# Patient Record
Sex: FEMALE | Race: WHITE | NOT HISPANIC OR LATINO | Employment: FULL TIME | URBAN - METROPOLITAN AREA
[De-identification: names, ages, dates, MRNs, and addresses within clinical notes are randomized per-mention and may not be internally consistent; named-entity substitution may affect disease eponyms.]

---

## 2020-09-24 ENCOUNTER — HOSPITAL ENCOUNTER (EMERGENCY)
Facility: CLINIC | Age: 30
Discharge: HOME | End: 2020-09-24
Attending: EMERGENCY MEDICINE | Admitting: EMERGENCY MEDICINE
Payer: COMMERCIAL

## 2020-09-24 VITALS
DIASTOLIC BLOOD PRESSURE: 75 MMHG | WEIGHT: 155 LBS | SYSTOLIC BLOOD PRESSURE: 136 MMHG | HEIGHT: 68 IN | TEMPERATURE: 99.1 F | BODY MASS INDEX: 23.49 KG/M2 | OXYGEN SATURATION: 96 % | RESPIRATION RATE: 18 BRPM | HEART RATE: 82 BPM

## 2020-09-24 DIAGNOSIS — R33.9 URINARY RETENTION: ICD-10-CM

## 2020-09-24 LAB
ALBUMIN UR-MCNC: 100 MG/DL
APPEARANCE UR: ABNORMAL
BILIRUB UR QL STRIP: NEGATIVE
COLOR UR AUTO: ABNORMAL
GLUCOSE UR STRIP-MCNC: NEGATIVE MG/DL
HGB UR QL STRIP: ABNORMAL
KETONES UR STRIP-MCNC: NEGATIVE MG/DL
LEUKOCYTE ESTERASE UR QL STRIP: ABNORMAL
MUCOUS THREADS #/AREA URNS LPF: PRESENT /LPF
NITRATE UR QL: NEGATIVE
PH UR STRIP: 7 PH (ref 5–7)
RBC #/AREA URNS AUTO: >182 /HPF (ref 0–2)
SOURCE: ABNORMAL
SP GR UR STRIP: 1.02 (ref 1–1.03)
UROBILINOGEN UR STRIP-MCNC: NORMAL MG/DL (ref 0–2)
WBC #/AREA URNS AUTO: 85 /HPF (ref 0–5)

## 2020-09-24 PROCEDURE — 25000132 ZZH RX MED GY IP 250 OP 250 PS 637: Performed by: EMERGENCY MEDICINE

## 2020-09-24 PROCEDURE — 87086 URINE CULTURE/COLONY COUNT: CPT | Performed by: EMERGENCY MEDICINE

## 2020-09-24 PROCEDURE — 51798 US URINE CAPACITY MEASURE: CPT

## 2020-09-24 PROCEDURE — 99284 EMERGENCY DEPT VISIT MOD MDM: CPT | Mod: 25

## 2020-09-24 PROCEDURE — 81001 URINALYSIS AUTO W/SCOPE: CPT | Performed by: EMERGENCY MEDICINE

## 2020-09-24 RX ORDER — ACETAMINOPHEN 500 MG
1000 TABLET ORAL ONCE
Status: COMPLETED | OUTPATIENT
Start: 2020-09-24 | End: 2020-09-24

## 2020-09-24 RX ADMIN — ACETAMINOPHEN 1000 MG: 500 TABLET ORAL at 20:42

## 2020-09-24 ASSESSMENT — ENCOUNTER SYMPTOMS
DIFFICULTY URINATING: 1
DYSURIA: 1

## 2020-09-24 ASSESSMENT — MIFFLIN-ST. JEOR: SCORE: 1461.58

## 2020-09-24 NOTE — ED AVS SNAPSHOT
[facility] Emergency Department  [address]  [facility] [address]  Phone:  [phone]  Fax:  [phone]                                    Cherie Israel   MRN: [id number]    Department:  [facility] Emergency Department   Date of Visit:  9/24/2020           After Visit Summary Signature Page    I have received my discharge instructions, and my questions have been answered. I have discussed any challenges I see with this plan with the nurse or doctor.    ..........................................................................................................................................  Patient/Patient Representative Signature      ..........................................................................................................................................  Patient Representative Print Name and Relationship to Patient    ..................................................               ................................................  Date                                   Time    ..........................................................................................................................................  Reviewed by Signature/Title    ...................................................              ..............................................  Date                                               Time          22EPIC Rev 08/18

## 2020-09-25 NOTE — ED PROVIDER NOTES
"  History     Chief Complaint:  Urinary Retention    HPI   Cherie Israel is a 30 year old female who presents with urinary retention. The patient had an IVF procedure today, and at 1430 she was unable to urinate. At about 1530 she began having pain when she would try to urinate, and describes it as similar to a bladder infection in that she could urinate a little bit but not fully empty her bladder. During her procedure today she was under general anesthesia.     Allergies:  No Known Drug Allergies      Medications:    Levothyroxine    Past Medical History:    Female infertility    Past Surgical History:    [place] tooth extraction  Lasix eye surgery    Family History:    History reviewed. No pertinent family history.      Social History:  Smoking status: No  Alcohol use: Yes  Patient presents with .  PCP: No primary care provider on file.    Marital Status:       Review of Systems   Genitourinary: Positive for difficulty urinating and dysuria.   All other systems reviewed and are negative.      Physical Exam     Patient Vitals for the past 24 hrs:   BP Temp Temp src Pulse Resp SpO2 Height Weight   09/24/20 2300 136/75 -- -- 82 -- 96 % -- --   09/24/20 2245 134/78 -- -- 89 -- 97 % -- --   09/24/20 2230 139/87 -- -- 88 -- 97 % -- --   09/24/20 2215 124/74 -- -- 79 -- 97 % -- --   09/24/20 2200 126/71 -- -- 81 -- 98 % -- --   09/24/20 2145 126/72 -- -- 85 -- 97 % -- --   09/24/20 2130 134/78 -- -- 90 -- 98 % -- --   09/24/20 1920 (!) 132/93 99.1  F (37.3  C) Oral 103 18 97 % 1.727 m (5' 8\") 70.3 kg (155 lb)      Physical Exam  Constitutional: Alert, attentive,  HENT:    Nose: Nose normal.   Eyes: EOM are normal.   CV: regular rate and rhythm; no murmurs, rubs or gallups  Chest: Effort normal and breath sounds normal.   GI:  There is no tenderness. No distension. Normal bowel sounds  MSK: Normal range of motion.   Neurological: Alert, attentive  Skin: Skin is warm and dry.      Emergency Department " Course     Laboratory:  UA with micro: Red cloudy urine, Blood: large, Protein albumin: 100, Leukocyte: large, WBC: 85 (H), RBC: >182 (H), Mucous: present, o/w WNL    Urine culture: In process    Interventions:  2042 Tylenol 1000 mg tablet PO     Emergency Department Course:  2123 Nursing notes and vitals reviewed. I performed an exam of the patient as documented above.     Medicine administered as documented above.    The patient provided a urine sample here in the emergency department. This was sent for laboratory testing, findings above.      2320 I rechecked the patient and discussed the results of her workup thus far.     Findings and plan explained to the Patient and spouse. Patient discharged home with instructions regarding supportive care, medications, and reasons to return. The importance of close follow-up was reviewed.    I personally reviewed the laboratory results with the Patient and answered all related questions prior to discharge.      Impression & Plan      Medical Decision Making:  This is a 30 year old female who presents for evaluation of urinary retention after IVF egg harvesting procedure today. She does have hematuria but has good urine flow via catheter after placement. No clots are noted. Urine sample does have some pyuria but doubt underlying infection. Culture is pending. Plan to discharge home with OBGYN follow up and catheter removal in 2-3 days. Restrict return precautions fever, vomiting, catheter problems, or any other concerns given. The patient is on doxycycline.     Diagnosis:    ICD-10-CM    1. Urinary retention  R33.9        Disposition:  discharged to home    Maria G Fuentes  9/24/2020   [facility] EMERGENCY DEPARTMENT    Scribe Disclosure:  I, Maria G Fuentes, am serving as a scribe at 9:23 PM on 9/24/2020 to document services personally performed by Sebastian Cerna MD based on my observations and the provider's statements to me.         Eryn  Sebastian Holt MD  09/25/20 0137

## 2020-09-25 NOTE — ED NOTES
1800 mL of bloody urine drained following insertion of catheter, pt reports feeling significantly better.

## 2020-09-26 LAB
BACTERIA SPEC CULT: NO GROWTH
Lab: NORMAL
SPECIMEN SOURCE: NORMAL

## 2020-09-26 NOTE — RESULT ENCOUNTER NOTE
Final urine culture report is NEGATIVE per [place] ED Lab Result protocol.    If NEGATIVE result, no change in treatment, per [place] ED Lab Result protocol.

## 2020-10-13 ENCOUNTER — TRANSFERRED RECORDS (OUTPATIENT)
Dept: HEALTH INFORMATION MANAGEMENT | Facility: CLINIC | Age: 30
End: 2020-10-13

## 2020-11-16 ENCOUNTER — TRANSCRIBE ORDERS (OUTPATIENT)
Dept: MATERNAL FETAL MEDICINE | Facility: CLINIC | Age: 30
End: 2020-11-16

## 2020-11-16 ENCOUNTER — MEDICAL CORRESPONDENCE (OUTPATIENT)
Dept: HEALTH INFORMATION MANAGEMENT | Facility: CLINIC | Age: 30
End: 2020-11-16

## 2020-11-16 ENCOUNTER — PRE VISIT (OUTPATIENT)
Dept: MATERNAL FETAL MEDICINE | Facility: CLINIC | Age: 30
End: 2020-11-16

## 2020-11-16 DIAGNOSIS — Z31.69 ENCOUNTER FOR PRECONCEPTION CONSULTATION: Primary | ICD-10-CM

## 2020-11-18 ENCOUNTER — VIRTUAL VISIT (OUTPATIENT)
Dept: MATERNAL FETAL MEDICINE | Facility: CLINIC | Age: 30
End: 2020-11-18
Attending: OBSTETRICS & GYNECOLOGY
Payer: COMMERCIAL

## 2020-11-18 DIAGNOSIS — Z31.69 ENCOUNTER FOR PRECONCEPTION CONSULTATION: ICD-10-CM

## 2020-11-18 DIAGNOSIS — Q95.1: Primary | ICD-10-CM

## 2020-11-18 PROCEDURE — 96040 HC GENETIC COUNSELING, EACH 30 MINUTES: CPT | Mod: TEL | Performed by: GENETIC COUNSELOR, MS

## 2020-11-18 NOTE — PROGRESS NOTES
[facility]  Genetic Counseling Consult    Patient: Cherie Israel YOB: 1990   Date of Service: 20      Cherie was evaluated via a billable telephone visit at [facility] for genetic consultation given chromosome abnormality on karyotype.     The patient has been notified of the following:  This telephone visit will be conducted via a call between you and your physician/provider. We have found that certain health care needs can be provided without the need for a physical exam. This service lets us provide the care you need with a short phone conversation. If a prescription is necessary we can send it directly to your pharmacy. If lab work is needed we can place an order for that and you can then stop by our lab to have the test done at a later time.     If during the course of the call the provider feels a telephone visit is not appropriate, you will not be charged for this service.        Impression/Plan:   1.  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  This finding is not expected to have a significant impact on her own health.      2.  Cherie reports that she and her  are planning for IVF pregnancy using donor eggs due to lack of viable eggs after 2x egg retrieval cycles.  We discussed that this type of parental inversion is typically not associated with significantly increased risk for miscarriage, birth defects, or cognitive impairment, however it is possible that it could have an impact on gamete production and be related to Cherie's poor egg quality.    Pregnancy History:   /Parity:    Age: 30 year old    Cherie s history includes 1 year of trying to conceive, followed by several rounds of unsuccessful IUI, after which Cherie and her   "began working with an [facility].  Cherie reports having two rounds of stimulation and egg retrieval with a total of 18 eggs retrieved.  Cherie reports that \"the eggs grew and divided incorrectly and were not a viable option for transfer\".  Records are not available for review and this history is per Cherie's report only.    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  This result indicates that for one of Cherie's 2 copies of chromosome 11, the long arm (q arm) has a section of DNA that has rotated in orientation 180 degrees.  This analysis does not identify and significant additional or deleted genetic material, but may not have the resolution necessary to identify small duplications or deletions around the chromosome break points.  However, it is expected that this finding would not have significant health implications for Cherie's own health.  We discussed that most individuals with paracentric inversions find out incidentally through genetic testing for another concern.      Chromosome rearrangements such as inversions can result in atypical chromosome complements being passed into gametes during sperm and egg production.  A paracentric inversion is an inversion in which the entire impacted region is within one arm of the chromosome, and does not involve the centromere.  Pericentric inversions, which include genetic material from both arms of the chromosome, have significantly increased risks for pregnancies with unbalanced chromosome complements due to recombination during meiosis.  For paracentric inversions like the one identified for Cherie, the possible gametes from a recombination event include: normal chromosome 11, paracentric inversion chromosome 11, acentric chromsome 11 fragment (nonviable) and dicentric chromosome 11 (highly unstable and nonviable).  Gametes with either an acentric chromosome fragment or a " dicentric chromosome 11 would not be viable.  It is theoretically possible for a gamete with a dicentric chromosome to rescue through chromosome breakage, which would result in an unbalanced deletion/duplication of chromosome material, which would impart risks for miscarriage, birth defects, and cognitive impairment.  However, examples of this are extremely rare.  In general, it is thought that pregnancies conceived by parents with paracentric inversions are not at significantly increased risk for miscarriage, birth defects, or cognitive impairment. We discussed that the implications of this type of inversion on gamete production is less clear, but that it is possible that this inversion is in some way contributing to the egg differences identified as part of her care for IVF.  Cherie reports that the couple is moving forward using donor eggs, and we discussed that this inversion is not expected to impact any future pregnancy achieved using donor eggs.      Cherie reports that she has two full sisters, and that both of her sisters have had multiple uncomplicated pregnancies, and no known history of miscarriage or difficulty getting pregnant.  We discussed that this specific chromosome 11 inversion has been identified in literature as a more common paracentric inversion, and that it is inherited from a parent in a majority of cases.  We discussed that testing Cherie's sisters could clarify if there is any possibility that their children could also have the inversion, although we discussed that this would not be expected to impact their health other than the potential for fertility challenges in adulthood.      All of Cherie's questions were answered to her satisfaction and she was encouraged to remain in contact with genetic counseling as needed moving forward.       It was a pleasure to be involved with Cherie s care.    Call start 12:50 PM  Call end 1:10 PM  Call Duration 20 minutes    Job Mead MS,  CAROL  Licensed Genetic Counselor  Phone: [phone]  Pager: [phone]

## 2021-06-03 LAB — TREPONEMA PALLIDUM ANTIBODY (EXTERNAL): NONREACTIVE

## 2021-12-17 LAB
GROUP B STREPTOCOCCUS (EXTERNAL): NEGATIVE
HEPATITIS B SURFACE ANTIGEN (EXTERNAL): NONREACTIVE
HIV1+2 AB SERPL QL IA: NONREACTIVE
RUBELLA ANTIBODY IGG (EXTERNAL): NORMAL

## 2022-01-03 DIAGNOSIS — Z01.812 ENCOUNTER FOR PREPROCEDURE SCREENING LABORATORY TESTING FOR COVID-19: Primary | ICD-10-CM

## 2022-01-03 DIAGNOSIS — Z11.52 ENCOUNTER FOR PREPROCEDURE SCREENING LABORATORY TESTING FOR COVID-19: Primary | ICD-10-CM

## 2022-01-06 ENCOUNTER — ANESTHESIA (OUTPATIENT)
Dept: OBGYN | Facility: CLINIC | Age: 32
End: 2022-01-06
Payer: COMMERCIAL

## 2022-01-06 ENCOUNTER — ANESTHESIA EVENT (OUTPATIENT)
Dept: OBGYN | Facility: CLINIC | Age: 32
End: 2022-01-06
Payer: COMMERCIAL

## 2022-01-06 ENCOUNTER — HOSPITAL ENCOUNTER (INPATIENT)
Facility: CLINIC | Age: 32
LOS: 3 days | Discharge: HOME | End: 2022-01-09
Attending: OBSTETRICS & GYNECOLOGY | Admitting: OBSTETRICS & GYNECOLOGY
Payer: COMMERCIAL

## 2022-01-06 DIAGNOSIS — D62 ANEMIA DUE TO BLOOD LOSS, ACUTE: ICD-10-CM

## 2022-01-06 DIAGNOSIS — O14.90 PRE-ECLAMPSIA, ANTEPARTUM: ICD-10-CM

## 2022-01-06 LAB
ABO/RH(D): NORMAL
ALBUMIN SERPL-MCNC: 2.6 G/DL (ref 3.4–5)
ALP SERPL-CCNC: 298 U/L (ref 40–150)
ALT SERPL W P-5'-P-CCNC: 23 U/L (ref 0–50)
ANION GAP SERPL CALCULATED.3IONS-SCNC: 7 MMOL/L (ref 3–14)
ANTIBODY SCREEN: NEGATIVE
AST SERPL W P-5'-P-CCNC: 22 U/L (ref 0–45)
BILIRUB SERPL-MCNC: 0.2 MG/DL (ref 0.2–1.3)
BUN SERPL-MCNC: 13 MG/DL (ref 7–30)
CALCIUM SERPL-MCNC: 8.6 MG/DL (ref 8.5–10.1)
CHLORIDE BLD-SCNC: 106 MMOL/L (ref 94–109)
CO2 SERPL-SCNC: 22 MMOL/L (ref 20–32)
CREAT SERPL-MCNC: 0.92 MG/DL (ref 0.52–1.04)
CREAT UR-MCNC: 37 MG/DL
ERYTHROCYTE [DISTWIDTH] IN BLOOD BY AUTOMATED COUNT: 12.3 % (ref 10–15)
GFR SERPL CREATININE-BSD FRML MDRD: 84 ML/MIN/1.73M2
GLUCOSE BLD-MCNC: 72 MG/DL (ref 70–99)
HCT VFR BLD AUTO: 42.9 % (ref 35–47)
HGB BLD-MCNC: 14 G/DL (ref 11.7–15.7)
MCH RBC QN AUTO: 28 PG (ref 26.5–33)
MCHC RBC AUTO-ENTMCNC: 32.6 G/DL (ref 31.5–36.5)
MCV RBC AUTO: 86 FL (ref 78–100)
PLATELET # BLD AUTO: 221 10E3/UL (ref 150–450)
POTASSIUM BLD-SCNC: 4.8 MMOL/L (ref 3.4–5.3)
PROT SERPL-MCNC: 7.1 G/DL (ref 6.8–8.8)
PROT UR-MCNC: 0.61 G/L
PROT/CREAT 24H UR: 1.65 G/G CR (ref 0–0.2)
RBC # BLD AUTO: 5 10E6/UL (ref 3.8–5.2)
RUPTURE OF FETAL MEMBRANES BY ROM PLUS: NEGATIVE
SARS-COV-2 RNA RESP QL NAA+PROBE: NEGATIVE
SODIUM SERPL-SCNC: 135 MMOL/L (ref 133–144)
SPECIMEN EXPIRATION DATE: NORMAL
WBC # BLD AUTO: 11.5 10E3/UL (ref 4–11)

## 2022-01-06 PROCEDURE — 3E0P7VZ INTRODUCTION OF HORMONE INTO FEMALE REPRODUCTIVE, VIA NATURAL OR ARTIFICIAL OPENING: ICD-10-PCS | Performed by: OBSTETRICS & GYNECOLOGY

## 2022-01-06 PROCEDURE — 370N000003 HC ANESTHESIA WARD SERVICE

## 2022-01-06 PROCEDURE — 00HU33Z INSERTION OF INFUSION DEVICE INTO SPINAL CANAL, PERCUTANEOUS APPROACH: ICD-10-PCS | Performed by: ANESTHESIOLOGY

## 2022-01-06 PROCEDURE — 250N000009 HC RX 250: Performed by: ANESTHESIOLOGY

## 2022-01-06 PROCEDURE — 86780 TREPONEMA PALLIDUM: CPT | Performed by: OBSTETRICS & GYNECOLOGY

## 2022-01-06 PROCEDURE — 80053 COMPREHEN METABOLIC PANEL: CPT | Performed by: OBSTETRICS & GYNECOLOGY

## 2022-01-06 PROCEDURE — 87635 SARS-COV-2 COVID-19 AMP PRB: CPT | Performed by: OBSTETRICS & GYNECOLOGY

## 2022-01-06 PROCEDURE — 85027 COMPLETE CBC AUTOMATED: CPT | Performed by: OBSTETRICS & GYNECOLOGY

## 2022-01-06 PROCEDURE — 84156 ASSAY OF PROTEIN URINE: CPT | Performed by: OBSTETRICS & GYNECOLOGY

## 2022-01-06 PROCEDURE — 82040 ASSAY OF SERUM ALBUMIN: CPT | Performed by: OBSTETRICS & GYNECOLOGY

## 2022-01-06 PROCEDURE — 84112 EVAL AMNIOTIC FLUID PROTEIN: CPT | Performed by: OBSTETRICS & GYNECOLOGY

## 2022-01-06 PROCEDURE — 120N000001 HC R&B MED SURG/OB

## 2022-01-06 PROCEDURE — G0463 HOSPITAL OUTPT CLINIC VISIT: HCPCS

## 2022-01-06 PROCEDURE — 258N000003 HC RX IP 258 OP 636: Performed by: OBSTETRICS & GYNECOLOGY

## 2022-01-06 PROCEDURE — 250N000011 HC RX IP 250 OP 636: Performed by: ANESTHESIOLOGY

## 2022-01-06 PROCEDURE — 3E0R3BZ INTRODUCTION OF ANESTHETIC AGENT INTO SPINAL CANAL, PERCUTANEOUS APPROACH: ICD-10-PCS | Performed by: ANESTHESIOLOGY

## 2022-01-06 PROCEDURE — 36415 COLL VENOUS BLD VENIPUNCTURE: CPT | Performed by: OBSTETRICS & GYNECOLOGY

## 2022-01-06 PROCEDURE — 250N000013 HC RX MED GY IP 250 OP 250 PS 637: Performed by: OBSTETRICS & GYNECOLOGY

## 2022-01-06 PROCEDURE — 86901 BLOOD TYPING SEROLOGIC RH(D): CPT | Performed by: OBSTETRICS & GYNECOLOGY

## 2022-01-06 RX ORDER — CARBOPROST TROMETHAMINE 250 UG/ML
250 INJECTION, SOLUTION INTRAMUSCULAR
Status: DISCONTINUED | OUTPATIENT
Start: 2022-01-06 | End: 2022-01-07 | Stop reason: HOSPADM

## 2022-01-06 RX ORDER — PROCHLORPERAZINE 25 MG
25 SUPPOSITORY, RECTAL RECTAL EVERY 12 HOURS PRN
Status: DISCONTINUED | OUTPATIENT
Start: 2022-01-06 | End: 2022-01-07 | Stop reason: HOSPADM

## 2022-01-06 RX ORDER — NALOXONE HYDROCHLORIDE 0.4 MG/ML
0.4 INJECTION, SOLUTION INTRAMUSCULAR; INTRAVENOUS; SUBCUTANEOUS
Status: DISCONTINUED | OUTPATIENT
Start: 2022-01-06 | End: 2022-01-07 | Stop reason: HOSPADM

## 2022-01-06 RX ORDER — ONDANSETRON 4 MG/1
4 TABLET, ORALLY DISINTEGRATING ORAL EVERY 6 HOURS PRN
Status: DISCONTINUED | OUTPATIENT
Start: 2022-01-06 | End: 2022-01-07 | Stop reason: HOSPADM

## 2022-01-06 RX ORDER — TRANEXAMIC ACID 10 MG/ML
1 INJECTION, SOLUTION INTRAVENOUS EVERY 30 MIN PRN
Status: DISCONTINUED | OUTPATIENT
Start: 2022-01-06 | End: 2022-01-07 | Stop reason: HOSPADM

## 2022-01-06 RX ORDER — NALOXONE HYDROCHLORIDE 0.4 MG/ML
0.2 INJECTION, SOLUTION INTRAMUSCULAR; INTRAVENOUS; SUBCUTANEOUS
Status: DISCONTINUED | OUTPATIENT
Start: 2022-01-06 | End: 2022-01-07 | Stop reason: HOSPADM

## 2022-01-06 RX ORDER — BUPIVACAINE HYDROCHLORIDE 2.5 MG/ML
INJECTION, SOLUTION EPIDURAL; INFILTRATION; INTRACAUDAL PRN
Status: DISCONTINUED | OUTPATIENT
Start: 2022-01-06 | End: 2022-01-07

## 2022-01-06 RX ORDER — IBUPROFEN 600 MG/1
600 TABLET, FILM COATED ORAL
Status: DISCONTINUED | OUTPATIENT
Start: 2022-01-06 | End: 2022-01-07

## 2022-01-06 RX ORDER — KETOROLAC TROMETHAMINE 30 MG/ML
30 INJECTION, SOLUTION INTRAMUSCULAR; INTRAVENOUS
Status: DISCONTINUED | OUTPATIENT
Start: 2022-01-06 | End: 2022-01-07

## 2022-01-06 RX ORDER — MISOPROSTOL 100 UG/1
25 TABLET ORAL
Status: DISCONTINUED | OUTPATIENT
Start: 2022-01-06 | End: 2022-01-07 | Stop reason: HOSPADM

## 2022-01-06 RX ORDER — NALBUPHINE HYDROCHLORIDE 10 MG/ML
2.5-5 INJECTION, SOLUTION INTRAMUSCULAR; INTRAVENOUS; SUBCUTANEOUS EVERY 6 HOURS PRN
Status: DISCONTINUED | OUTPATIENT
Start: 2022-01-06 | End: 2022-01-07

## 2022-01-06 RX ORDER — HYDROXYZINE HYDROCHLORIDE 50 MG/1
50 TABLET, FILM COATED ORAL
Status: DISCONTINUED | OUTPATIENT
Start: 2022-01-06 | End: 2022-01-07 | Stop reason: HOSPADM

## 2022-01-06 RX ORDER — ONDANSETRON 2 MG/ML
4 INJECTION INTRAMUSCULAR; INTRAVENOUS EVERY 6 HOURS PRN
Status: DISCONTINUED | OUTPATIENT
Start: 2022-01-06 | End: 2022-01-06

## 2022-01-06 RX ORDER — METHYLERGONOVINE MALEATE 0.2 MG/ML
200 INJECTION INTRAVENOUS
Status: DISCONTINUED | OUTPATIENT
Start: 2022-01-06 | End: 2022-01-07 | Stop reason: HOSPADM

## 2022-01-06 RX ORDER — ONDANSETRON 2 MG/ML
4 INJECTION INTRAMUSCULAR; INTRAVENOUS EVERY 6 HOURS PRN
Status: DISCONTINUED | OUTPATIENT
Start: 2022-01-06 | End: 2022-01-07 | Stop reason: HOSPADM

## 2022-01-06 RX ORDER — OXYTOCIN 10 [USP'U]/ML
10 INJECTION, SOLUTION INTRAMUSCULAR; INTRAVENOUS
Status: DISCONTINUED | OUTPATIENT
Start: 2022-01-06 | End: 2022-01-07

## 2022-01-06 RX ORDER — LIDOCAINE HYDROCHLORIDE AND EPINEPHRINE 15; 5 MG/ML; UG/ML
INJECTION, SOLUTION EPIDURAL PRN
Status: DISCONTINUED | OUTPATIENT
Start: 2022-01-06 | End: 2022-01-07

## 2022-01-06 RX ORDER — OXYTOCIN/0.9 % SODIUM CHLORIDE 30/500 ML
100-340 PLASTIC BAG, INJECTION (ML) INTRAVENOUS CONTINUOUS PRN
Status: DISCONTINUED | OUTPATIENT
Start: 2022-01-06 | End: 2022-01-07

## 2022-01-06 RX ORDER — ASPIRIN 81 MG/1
81 TABLET, CHEWABLE ORAL DAILY
Status: ON HOLD | COMMUNITY
End: 2022-01-08

## 2022-01-06 RX ORDER — EPHEDRINE SULFATE 50 MG/ML
5 INJECTION, SOLUTION INTRAMUSCULAR; INTRAVENOUS; SUBCUTANEOUS
Status: DISCONTINUED | OUTPATIENT
Start: 2022-01-06 | End: 2022-01-07 | Stop reason: HOSPADM

## 2022-01-06 RX ORDER — PRENATAL VIT/IRON FUM/FOLIC AC 27MG-0.8MG
1 TABLET ORAL DAILY
COMMUNITY

## 2022-01-06 RX ORDER — METOCLOPRAMIDE 10 MG/1
10 TABLET ORAL EVERY 6 HOURS PRN
Status: DISCONTINUED | OUTPATIENT
Start: 2022-01-06 | End: 2022-01-07 | Stop reason: HOSPADM

## 2022-01-06 RX ORDER — OXYTOCIN/0.9 % SODIUM CHLORIDE 30/500 ML
340 PLASTIC BAG, INJECTION (ML) INTRAVENOUS CONTINUOUS PRN
Status: DISCONTINUED | OUTPATIENT
Start: 2022-01-06 | End: 2022-01-07 | Stop reason: HOSPADM

## 2022-01-06 RX ORDER — ONDANSETRON 4 MG/1
4 TABLET, ORALLY DISINTEGRATING ORAL EVERY 6 HOURS PRN
Status: DISCONTINUED | OUTPATIENT
Start: 2022-01-06 | End: 2022-01-06

## 2022-01-06 RX ORDER — MISOPROSTOL 200 UG/1
800 TABLET ORAL
Status: DISCONTINUED | OUTPATIENT
Start: 2022-01-06 | End: 2022-01-07 | Stop reason: HOSPADM

## 2022-01-06 RX ORDER — SODIUM CHLORIDE, SODIUM LACTATE, POTASSIUM CHLORIDE, CALCIUM CHLORIDE 600; 310; 30; 20 MG/100ML; MG/100ML; MG/100ML; MG/100ML
INJECTION, SOLUTION INTRAVENOUS CONTINUOUS
Status: DISCONTINUED | OUTPATIENT
Start: 2022-01-06 | End: 2022-01-07

## 2022-01-06 RX ORDER — METOCLOPRAMIDE HYDROCHLORIDE 5 MG/ML
10 INJECTION INTRAMUSCULAR; INTRAVENOUS EVERY 6 HOURS PRN
Status: DISCONTINUED | OUTPATIENT
Start: 2022-01-06 | End: 2022-01-07 | Stop reason: HOSPADM

## 2022-01-06 RX ORDER — PROCHLORPERAZINE MALEATE 10 MG
10 TABLET ORAL EVERY 6 HOURS PRN
Status: DISCONTINUED | OUTPATIENT
Start: 2022-01-06 | End: 2022-01-07 | Stop reason: HOSPADM

## 2022-01-06 RX ORDER — MISOPROSTOL 200 UG/1
400 TABLET ORAL
Status: DISCONTINUED | OUTPATIENT
Start: 2022-01-06 | End: 2022-01-07 | Stop reason: HOSPADM

## 2022-01-06 RX ORDER — FENTANYL CITRATE 50 UG/ML
50-100 INJECTION, SOLUTION INTRAMUSCULAR; INTRAVENOUS
Status: DISCONTINUED | OUTPATIENT
Start: 2022-01-06 | End: 2022-01-07 | Stop reason: HOSPADM

## 2022-01-06 RX ORDER — OXYTOCIN 10 [USP'U]/ML
10 INJECTION, SOLUTION INTRAMUSCULAR; INTRAVENOUS
Status: DISCONTINUED | OUTPATIENT
Start: 2022-01-06 | End: 2022-01-07 | Stop reason: HOSPADM

## 2022-01-06 RX ADMIN — SODIUM CHLORIDE, POTASSIUM CHLORIDE, SODIUM LACTATE AND CALCIUM CHLORIDE 1000 ML: 600; 310; 30; 20 INJECTION, SOLUTION INTRAVENOUS at 20:16

## 2022-01-06 RX ADMIN — MISOPROSTOL 25 MCG: 100 TABLET ORAL at 15:39

## 2022-01-06 RX ADMIN — LIDOCAINE HYDROCHLORIDE,EPINEPHRINE BITARTRATE 2 ML: 15; .005 INJECTION, SOLUTION EPIDURAL; INFILTRATION; INTRACAUDAL; PERINEURAL at 20:01

## 2022-01-06 RX ADMIN — MISOPROSTOL 25 MCG: 100 TABLET ORAL at 17:35

## 2022-01-06 RX ADMIN — MISOPROSTOL 25 MCG: 100 TABLET ORAL at 13:45

## 2022-01-06 RX ADMIN — BUPIVACAINE HYDROCHLORIDE 10 ML: 2.5 INJECTION, SOLUTION EPIDURAL; INFILTRATION; INTRACAUDAL at 20:06

## 2022-01-06 RX ADMIN — SODIUM CHLORIDE, POTASSIUM CHLORIDE, SODIUM LACTATE AND CALCIUM CHLORIDE 1000 ML: 600; 310; 30; 20 INJECTION, SOLUTION INTRAVENOUS at 17:52

## 2022-01-06 RX ADMIN — LIDOCAINE HYDROCHLORIDE,EPINEPHRINE BITARTRATE 3 ML: 15; .005 INJECTION, SOLUTION EPIDURAL; INFILTRATION; INTRACAUDAL; PERINEURAL at 19:57

## 2022-01-06 RX ADMIN — Medication: at 20:16

## 2022-01-06 ASSESSMENT — ACTIVITIES OF DAILY LIVING (ADL)
ADLS_ACUITY_SCORE: 4

## 2022-01-06 ASSESSMENT — MIFFLIN-ST. JEOR: SCORE: 1653.89

## 2022-01-06 NOTE — PROVIDER NOTIFICATION
01/06/22 1743   Provider Notification   Provider Name/Title Kishore   Method of Notification In Department   Request Evaluate - Remote   Notification Reason Labor Status;Uterine Activity;Pain;Status Update   Ok for IV fluid flush proceed wit dose 3

## 2022-01-06 NOTE — PROVIDER NOTIFICATION
01/06/22 0933   Provider Notification   Provider Name/Title Dr Roberts   Method of Notification Phone   Request Evaluate - Remote   Notification Reason Labor Status;Membrane Status;Other (Comment);Status Update;Maternal Vital Sign Change   Paged MD updated reg her arrival,r/o SROM,no fluid noted,1st BP above normal range serial BP started,and pt received IVF treatment.Pt takes PV and on Thyroid meds.Order received for ROM+lazara-orbital cellulitis d/w pt and SO and they voiced understandings.

## 2022-01-06 NOTE — PROVIDER NOTIFICATION
01/06/22 1238   Provider Notification   Provider Name/Title Dr Whitehead   Method of Notification At Bedside   Comments   Comments EFM tracing and lab results were reviewed. Reviewed results with pt and her SO. Discussed option for cervical ripening and pitocin related to induction of labor due to mild preeclampsia. Will start oral cytotec. Pt agrees to proceed with above.

## 2022-01-06 NOTE — PLAN OF CARE
Data: [facility] presented to [facility]: 2022  8:45 AM.  Reason for maternal/fetal assessment is leaking vaginal fluid. Patient reports I had small amount of fluid and when I up to BR noticed more trickling and wet my underwear.Patient is a .  Prenatal record reviewed. Pregnancy has been uncomplicated.However this IVF pregnancy.  Gestational Age 40w5d. VSS except BP above range serial BP started and explained to pt. Fetal movement active. Patient denies uterine contractions, vaginal bleeding, abdominal pain, pelvic pressure, nausea, vomiting, headache, visual disturbances, epigastric or URQ pain, significant edema. Support person is present.   Action: Verbal consent for EFM. Triage assessment completed. Bill of rights reviewed.  Response: Patient verbalized agreement with plan. Will contact Dr Carmen Roberts with update and for further orders.

## 2022-01-06 NOTE — H&P
"  2022    Cherie Israel  [id number]            OB Admit History & Physical      Ms. Israel  is admitted from triage, where she presented with r/o ROM. This was ruled out, but incidental mild range htn was noted. Labs were obtained, and elevated protein:creatinine ratio was noted (1.65).     She has noticed no headaches, visual changes, RUQ pain, sudden increase in swelling.     No LMP recorded. Patient is pregnant.   Her Estimated Date of Delivery: 2022  , making her 40w5d  wks.      Estimated body mass index is 28.8 kg/m  as calculated from the following:    Height as of this encounter: 1.753 m (5' 9\").    Weight as of this encounter: 88.5 kg (195 lb).  Her prenatal course has been complicated by IVF pregnancy, postdates pregnancy, bilateral fetal pyelectasis, hypothyroidism.    See prenatal for labs.  neg GBBS, Rubella Immune, RH positive    Estimated fetal weight= 3300gm       She is a 31 year old   Her OB history:   OB History    Para Term  AB Living   1 0 0 0 0 0   SAB IAB Ectopic Multiple Live Births   0 0 0 0 0      # Outcome Date GA Lbr Artem/2nd Weight Sex Delivery Anes PTL Lv   1 Current                     Past Medical History:   Diagnosis Date     Thyroid disease           Past Surgical History:   Procedure Laterality Date     EYE SURGERY           No current outpatient medications on file.       Allergies: Patient has no known allergies.      REVIEW OF SYSTEMS:  NEUROLOGIC:  Negative  EYES:  Negative  ENT:  Negative  GI:  Negative  BREAST:  Negative  :  Negative  GYN:  Negative  CV:  Negative  PULMONARY:  Negative  MUSCULOSKELETAL:  Negative  PSYCH:  Negative        Social History     Socioeconomic History     Marital status:      Spouse name: Not on file     Number of children: Not on file     Years of education: Not on file     Highest education level: Not on file   Occupational History     Not on file   Tobacco Use     Smoking status: Not on file     " Smokeless tobacco: Not on file   Substance and Sexual Activity     Alcohol use: Not on file     Drug use: Not on file     Sexual activity: Not on file   Other Topics Concern     Not on file   Social History Narrative     Not on file     Social Determinants of Health     Financial Resource Strain: Not on file   Food Insecurity: Not on file   Transportation Needs: Not on file   Physical Activity: Not on file   Stress: Not on file   Social Connections: Not on file   Intimate Partner Violence: Not on file   Housing Stability: Not on file      No family history on file.          Vitals:   FHT cat 1  With no contractions     Alert Awake in NAD  HEENT grossly normal  Neck: no lymphadenopathy or thryoidomegaly  Lungs CTAB  Back no spinal or CVAT  Heart RRR  ABD gravid, nontender on exam with vtx palpable  Pelvic:  no fluid noted, no blood noted  Cervix is 0 cm / 50 % effaced at -2 station  EXT:  no edema or calf tenderness  Neuro:  Grossly intact    Assessment/Plan:  IUP at 40w5d      Preeclampsia without severe features  -cervical ripening with cytotec, then IOL    High risk pregnancy  -postdates  -IVF  -hypothyroid  -GBS neg      Carmen Roberts MD   Dept of OB/GYN  January 6, 2022

## 2022-01-07 LAB
ABO/RH(D): NORMAL
ERYTHROCYTE [DISTWIDTH] IN BLOOD BY AUTOMATED COUNT: 12.2 % (ref 10–15)
HCT VFR BLD AUTO: 38.4 % (ref 35–47)
HGB BLD-MCNC: 12.4 G/DL (ref 11.7–15.7)
HOLD SPECIMEN: NORMAL
MCH RBC QN AUTO: 28.2 PG (ref 26.5–33)
MCHC RBC AUTO-ENTMCNC: 32.3 G/DL (ref 31.5–36.5)
MCV RBC AUTO: 87 FL (ref 78–100)
PLATELET # BLD AUTO: 241 10E3/UL (ref 150–450)
RBC # BLD AUTO: 4.4 10E6/UL (ref 3.8–5.2)
SPECIMEN EXPIRATION DATE: NORMAL
T PALLIDUM AB SER QL: NONREACTIVE
WBC # BLD AUTO: 35.8 10E3/UL (ref 4–11)

## 2022-01-07 PROCEDURE — 250N000013 HC RX MED GY IP 250 OP 250 PS 637: Performed by: OBSTETRICS & GYNECOLOGY

## 2022-01-07 PROCEDURE — 85027 COMPLETE CBC AUTOMATED: CPT | Performed by: OBSTETRICS & GYNECOLOGY

## 2022-01-07 PROCEDURE — 258N000003 HC RX IP 258 OP 636: Performed by: OBSTETRICS & GYNECOLOGY

## 2022-01-07 PROCEDURE — 86901 BLOOD TYPING SEROLOGIC RH(D): CPT | Performed by: OBSTETRICS & GYNECOLOGY

## 2022-01-07 PROCEDURE — 250N000009 HC RX 250: Performed by: OBSTETRICS & GYNECOLOGY

## 2022-01-07 PROCEDURE — 722N000001 HC LABOR CARE VAGINAL DELIVERY SINGLE

## 2022-01-07 PROCEDURE — 36415 COLL VENOUS BLD VENIPUNCTURE: CPT | Performed by: OBSTETRICS & GYNECOLOGY

## 2022-01-07 PROCEDURE — 0KQM0ZZ REPAIR PERINEUM MUSCLE, OPEN APPROACH: ICD-10-PCS | Performed by: OBSTETRICS & GYNECOLOGY

## 2022-01-07 PROCEDURE — 120N000001 HC R&B MED SURG/OB

## 2022-01-07 RX ORDER — IBUPROFEN 800 MG/1
800 TABLET, FILM COATED ORAL EVERY 6 HOURS PRN
Status: DISCONTINUED | OUTPATIENT
Start: 2022-01-07 | End: 2022-01-09 | Stop reason: HOSPADM

## 2022-01-07 RX ORDER — OXYTOCIN/0.9 % SODIUM CHLORIDE 30/500 ML
340 PLASTIC BAG, INJECTION (ML) INTRAVENOUS CONTINUOUS PRN
Status: COMPLETED | OUTPATIENT
Start: 2022-01-07 | End: 2022-01-07

## 2022-01-07 RX ORDER — MODIFIED LANOLIN
OINTMENT (GRAM) TOPICAL
Status: DISCONTINUED | OUTPATIENT
Start: 2022-01-07 | End: 2022-01-09 | Stop reason: HOSPADM

## 2022-01-07 RX ORDER — CARBOPROST TROMETHAMINE 250 UG/ML
250 INJECTION, SOLUTION INTRAMUSCULAR
Status: DISCONTINUED | OUTPATIENT
Start: 2022-01-07 | End: 2022-01-09 | Stop reason: HOSPADM

## 2022-01-07 RX ORDER — METHYLERGONOVINE MALEATE 0.2 MG/ML
200 INJECTION INTRAVENOUS
Status: DISCONTINUED | OUTPATIENT
Start: 2022-01-07 | End: 2022-01-09 | Stop reason: HOSPADM

## 2022-01-07 RX ORDER — OXYTOCIN 10 [USP'U]/ML
10 INJECTION, SOLUTION INTRAMUSCULAR; INTRAVENOUS
Status: DISCONTINUED | OUTPATIENT
Start: 2022-01-07 | End: 2022-01-09 | Stop reason: HOSPADM

## 2022-01-07 RX ORDER — ACETAMINOPHEN 325 MG/1
650 TABLET ORAL EVERY 4 HOURS PRN
Status: DISCONTINUED | OUTPATIENT
Start: 2022-01-07 | End: 2022-01-09 | Stop reason: HOSPADM

## 2022-01-07 RX ORDER — HYDROCORTISONE 2.5 %
CREAM (GRAM) TOPICAL 3 TIMES DAILY PRN
Status: DISCONTINUED | OUTPATIENT
Start: 2022-01-07 | End: 2022-01-09 | Stop reason: HOSPADM

## 2022-01-07 RX ORDER — BISACODYL 10 MG
10 SUPPOSITORY, RECTAL RECTAL DAILY PRN
Status: DISCONTINUED | OUTPATIENT
Start: 2022-01-07 | End: 2022-01-09 | Stop reason: HOSPADM

## 2022-01-07 RX ORDER — CALCIUM CARBONATE 500 MG/1
1000 TABLET, CHEWABLE ORAL 3 TIMES DAILY PRN
Status: DISCONTINUED | OUTPATIENT
Start: 2022-01-07 | End: 2022-01-07

## 2022-01-07 RX ORDER — SODIUM CHLORIDE, SODIUM LACTATE, POTASSIUM CHLORIDE, CALCIUM CHLORIDE 600; 310; 30; 20 MG/100ML; MG/100ML; MG/100ML; MG/100ML
INJECTION, SOLUTION INTRAVENOUS CONTINUOUS PRN
Status: DISCONTINUED | OUTPATIENT
Start: 2022-01-07 | End: 2022-01-07 | Stop reason: HOSPADM

## 2022-01-07 RX ORDER — NIFEDIPINE 30 MG/1
30 TABLET, EXTENDED RELEASE ORAL DAILY
Status: DISCONTINUED | OUTPATIENT
Start: 2022-01-07 | End: 2022-01-08

## 2022-01-07 RX ORDER — TRANEXAMIC ACID 10 MG/ML
1 INJECTION, SOLUTION INTRAVENOUS EVERY 30 MIN PRN
Status: DISCONTINUED | OUTPATIENT
Start: 2022-01-07 | End: 2022-01-09 | Stop reason: HOSPADM

## 2022-01-07 RX ORDER — MISOPROSTOL 200 UG/1
800 TABLET ORAL
Status: DISCONTINUED | OUTPATIENT
Start: 2022-01-07 | End: 2022-01-09 | Stop reason: HOSPADM

## 2022-01-07 RX ORDER — MISOPROSTOL 200 UG/1
400 TABLET ORAL
Status: DISCONTINUED | OUTPATIENT
Start: 2022-01-07 | End: 2022-01-09 | Stop reason: HOSPADM

## 2022-01-07 RX ORDER — OXYTOCIN/0.9 % SODIUM CHLORIDE 30/500 ML
1-24 PLASTIC BAG, INJECTION (ML) INTRAVENOUS CONTINUOUS
Status: DISCONTINUED | OUTPATIENT
Start: 2022-01-07 | End: 2022-01-07 | Stop reason: HOSPADM

## 2022-01-07 RX ORDER — DOCUSATE SODIUM 100 MG/1
100 CAPSULE, LIQUID FILLED ORAL DAILY
Status: DISCONTINUED | OUTPATIENT
Start: 2022-01-07 | End: 2022-01-09 | Stop reason: HOSPADM

## 2022-01-07 RX ADMIN — TRANEXAMIC ACID 1 G: 10 INJECTION, SOLUTION INTRAVENOUS at 06:19

## 2022-01-07 RX ADMIN — SODIUM CHLORIDE, POTASSIUM CHLORIDE, SODIUM LACTATE AND CALCIUM CHLORIDE: 600; 310; 30; 20 INJECTION, SOLUTION INTRAVENOUS at 01:10

## 2022-01-07 RX ADMIN — ACETAMINOPHEN 650 MG: 325 TABLET, FILM COATED ORAL at 09:15

## 2022-01-07 RX ADMIN — Medication 100 ML/HR: at 07:40

## 2022-01-07 RX ADMIN — IBUPROFEN 800 MG: 800 TABLET, FILM COATED ORAL at 16:57

## 2022-01-07 RX ADMIN — IBUPROFEN 800 MG: 800 TABLET, FILM COATED ORAL at 23:02

## 2022-01-07 RX ADMIN — IBUPROFEN 600 MG: 600 TABLET, FILM COATED ORAL at 05:06

## 2022-01-07 RX ADMIN — DOCUSATE SODIUM 100 MG: 100 CAPSULE ORAL at 09:15

## 2022-01-07 RX ADMIN — CALCIUM CARBONATE (ANTACID) CHEW TAB 500 MG 1000 MG: 500 CHEW TAB at 01:06

## 2022-01-07 RX ADMIN — IBUPROFEN 800 MG: 800 TABLET, FILM COATED ORAL at 11:08

## 2022-01-07 RX ADMIN — ACETAMINOPHEN 650 MG: 325 TABLET, FILM COATED ORAL at 18:23

## 2022-01-07 RX ADMIN — MISOPROSTOL 800 MCG: 200 TABLET ORAL at 04:47

## 2022-01-07 RX ADMIN — ACETAMINOPHEN 650 MG: 325 TABLET, FILM COATED ORAL at 23:02

## 2022-01-07 RX ADMIN — Medication 2 MILLI-UNITS/MIN: at 02:33

## 2022-01-07 RX ADMIN — NIFEDIPINE 30 MG: 30 TABLET, FILM COATED, EXTENDED RELEASE ORAL at 18:23

## 2022-01-07 RX ADMIN — ACETAMINOPHEN 650 MG: 325 TABLET, FILM COATED ORAL at 14:19

## 2022-01-07 ASSESSMENT — ACTIVITIES OF DAILY LIVING (ADL)
ADLS_ACUITY_SCORE: 4

## 2022-01-07 NOTE — PROVIDER NOTIFICATION
01/07/22 0720   Provider Notification   Provider Name/Title Dr Mario   Method of Notification Phone   Request Evaluate - Remote   Notification Reason Status Update     Dr Mario called department for update.  Informed of blood loss, dark emily urine with straight catheterization at 0500, meds given after delivery and current vitals.  Orders obtained for vitals every 4 hours, cbc this am, and to give a second bag of oxytocin to prevent further bleeding.

## 2022-01-07 NOTE — PROVIDER NOTIFICATION
01/06/22 2142   Provider Notification   Provider Name/Title FurusethMD   Method of Notification In Department   Request Evaluate - Remote   Notification Reason Decels;Labor Status;Uterine Activity;Status Update;SVE   reviewed VE , FHTs with decels after ga placement, VSS , FHTs now recovered

## 2022-01-07 NOTE — PROVIDER NOTIFICATION
01/06/22 7040   Provider Notification   Provider Name/Title Dr Roberts   Method of Notification At Bedside   Request Evaluate in Person   Notification Reason Labor Status     Dr Roberts at patient's bedside to attempt AROM.  Patient is completely dilated.  Discussing pushing with patient.  MD observing category 2 tracing in room.

## 2022-01-07 NOTE — PROVIDER NOTIFICATION
01/07/22 0605   Provider Notification   Provider Name/Title dr roberts   Method of Notification Phone   Request Evaluate-Remote   Notification Reason Status Update     Dr Roberts notified of pad weight of 115 with no clots.  Patient states she had changed her position and felt a gush of blood.  With fundal exam was 1 below and scant flow and no clots.  When straight catheter performed was 200ml of dark emily urine.  Will plan to give 1 dose of TXA and continue to observe bleeding

## 2022-01-07 NOTE — L&D DELIVERY NOTE
Cherie Israel is a 31 year old  who was admitted at 1400 22. She presented to rule out PROM, and was found to have a negative ROM+. She was noted to be 0 cm dilated. Additionally, mild range elevated Bps were noted, and protein/creatinine ratio was elevated at 1.6. Pt was admitted for cervical ripening an induction. This pregnancy was complicated by IVF pregnancy, postdates pregnancy, hypothyroidism. GBS neg, Rh positive, Covid negative. Labor progressed spontaneously after 3 doses of oral cytotec, and epidural was administered. Pitocin augmentation was begun, as contractions were adequate. Rupture of membranes was uncertain (0545 ?), and thick meconium fluid was noted. She progressed to complete. She pushed for approximately 3.5h, at which point she was exhausted. Mushroom vacuum was used to bring baby to +5 station, and she was allowed to finish pushing. At 0328, she experienced VAVD of a vigorous male , from an ROP position, over a large second degree laceration with bilateral sulcal tears. Repair was performed with 3.0 vicryl.  APGARS 8/9. Pitocin was begun after delivery of the baby. The cord was cut at 80seconds. A three vessel cord was noted. The placenta delivered spontaneously, and found to appear intact on inspection. QBL 554mL.

## 2022-01-07 NOTE — PROVIDER NOTIFICATION
01/07/22 0230   Provider Notification   Provider Name/Title Dr Roberts   Method of Notification At Bedside   Request Evaluate in Person   Notification Reason Labor Status     Dr Roberts at bedside pushing with patient.  Has not left bedside since beginning of pushing.  Noting that contractions are spacing out discussed augmenting with oxytocin to strengthen contractions and increase frequency.  MD noting category 2 tracing and remaining at bedside.

## 2022-01-07 NOTE — PROVIDER NOTIFICATION
01/07/22 0444   Provider Notification   Provider Name/Title Dr. Roberts   Method of Notification Phone   Dr. Roberts updated on increased flow, 150mL clot expressed with last fundal exam.  TORB for 800mcg rectal cytotec x1 now, and to perform straight catheterization.  Will update pt on POC and continue to monitor closely.

## 2022-01-07 NOTE — ANESTHESIA POSTPROCEDURE EVALUATION
Patient: Cherie Israel    Procedure: * No procedures listed *       Diagnosis:* No pre-op diagnosis entered *  Diagnosis Additional Information: No value filed.    Anesthesia Type:  Epidural    Note:     Postop Pain Control: Uneventful            Sign Out: Well controlled pain   PONV: No   Neuro/Psych: Uneventful            Sign Out: Acceptable/Baseline neuro status   Airway/Respiratory: Uneventful            Sign Out: Acceptable/Baseline resp. status   CV/Hemodynamics: Uneventful            Sign Out: Acceptable CV status; No obvious hypovolemia; No obvious fluid overload   Other NRE: NONE   DID A NON-ROUTINE EVENT OCCUR? No    Event details/Postop Comments:  .Labor Epidural Post delivery note.      Doing well. VSS Temp normal. Satisfactory respiratory and cardiovascular function. Return of neurologic function. Questions encouraged and answered. Denies positional headache. Minimal side effects easily managed w/ PRN meds. No apparent anesthetic complications. No follow-up required.    I or my partner was immediately available for epidural management.    RAFAL Mac             Last vitals:  Vitals Value Taken Time   BP     Temp     Pulse     Resp     SpO2         Electronically Signed By: Sampson Mac DO  January 7, 2022  8:12 AM

## 2022-01-07 NOTE — ANESTHESIA PREPROCEDURE EVALUATION
Anesthesia Pre-Procedure Evaluation    Patient: Cherie Israel   MRN: [id number] : 1990        Preoperative Diagnosis: * No surgery found *    Procedure :           Past Medical History:   Diagnosis Date     Thyroid disease       Past Surgical History:   Procedure Laterality Date     EYE SURGERY        No Known Allergies   Social History     Tobacco Use     Smoking status: Not on file     Smokeless tobacco: Not on file   Substance Use Topics     Alcohol use: Not on file      Wt Readings from Last 1 Encounters:   22 88.5 kg (195 lb)        Anesthesia Evaluation            ROS/MED HX  ENT/Pulmonary:  - neg pulmonary ROS     Neurologic:  - neg neurologic ROS     Cardiovascular:     (+) hypertension--PIH and Mild/mod ---    METS/Exercise Tolerance:     Hematologic:  - neg hematologic  ROS     Musculoskeletal:  - neg musculoskeletal ROS     GI/Hepatic:  - neg GI/hepatic ROS     Renal/Genitourinary:  - neg Renal ROS     Endo:     (+) thyroid problem, hypothyroidism,     Psychiatric/Substance Use:  - neg psychiatric ROS     Infectious Disease:  - neg infectious disease ROS     Malignancy:       Other:            Physical Exam    Airway        Mallampati: II   TM distance: > 3 FB   Neck ROM: full     Respiratory Devices and Support         Dental  no notable dental history         Cardiovascular          Rhythm and rate: regular and normal     Pulmonary   pulmonary exam normal                OUTSIDE LABS:  CBC:   Lab Results   Component Value Date    WBC 11.5 (H) 2022    HGB 14.0 2022    HCT 42.9 2022     2022     BMP:   Lab Results   Component Value Date     2022    POTASSIUM 4.8 2022    CHLORIDE 106 2022    CO2 22 2022    BUN 13 2022    CR 0.92 2022    GLC 72 2022     COAGS: No results found for: PTT, INR, FIBR  POC: No results found for: BGM, HCG, HCGS  HEPATIC:   Lab Results   Component Value Date    ALBUMIN 2.6 (L)  01/06/2022    PROTTOTAL 7.1 01/06/2022    ALT 23 01/06/2022    AST 22 01/06/2022    ALKPHOS 298 (H) 01/06/2022    BILITOTAL 0.2 01/06/2022     OTHER:   Lab Results   Component Value Date    DEISY 8.6 01/06/2022       Anesthesia Plan    ASA Status:  2   - Procedure: Procedure only, no anesthetic delivered      Anesthesia Type: Epidural.              Consents    Anesthesia Plan(s) and associated risks, benefits, and realistic alternatives discussed. Questions answered and patient/representative(s) expressed understanding.    - Discussed:     - Discussed with:  Patient         Postoperative Care            Comments:    Other Comments: Continuous Labor Epidural: Indication is for labor pain. Following an discussion of the procedure, epidural expectations, and risks and benefits (risks including, but not limited to, nerve damage, infection, bleeding/hematoma, inadvertent dural puncture, spinal headache, partial or failed block), the patient appears to understand and consents to proceed. Questions were encouraged and answered.             Erwin Ozuna MD

## 2022-01-07 NOTE — PROGRESS NOTES
"LABOR NOTE    Subjective: Reports discomfort with cramping. S/p 3 doses oral cyotec.    Objective:  BP (!) 147/93   Pulse 60   Temp 97.8  F (36.6  C) (Oral)   Resp 18   Ht 1.753 m (5' 9\")   Wt 88.5 kg (195 lb)   Breastfeeding No   BMI 28.80 kg/m     FHT: cat 1    [place]: occasional  SVE: not rechecked, 0/50/-2    Assessment and Plan:    1. 31 year old  female at 40w5d  2. FHT category 1.     Assessment/Plan:  IUP at 40w5d       Preeclampsia without severe features  -cervical ripening with cytotec, then IOL     High risk pregnancy  -postdates  -IVF  -hypothyroid  -GBS neg    Carmen Roberts MD    "

## 2022-01-07 NOTE — PROVIDER NOTIFICATION
01/07/22 0939   Provider Notification   Provider Name/Title Dr. Burdick   Method of Notification Phone   Notification Reason Vital Signs Change   Notified of oral temp 100.5 during rounds. Not concerned at this time. Will cont to monitor all vital signs closely and will notify MD of changes.

## 2022-01-07 NOTE — PROGRESS NOTES
"LABOR NOTE    Subjective: Reports comfort with epidural. Progressed to active labor after third dose of oral cytotec. Shay in place.    Objective:  /55   Pulse 60   Temp 98.9  F (37.2  C) (Oral)   Resp 16   Ht 1.753 m (5' 9\")   Wt 88.5 kg (195 lb)   SpO2 96%   Breastfeeding No   BMI 28.80 kg/m     FHT: category 1  [place]: q 3 min  SVE: 6-7/90/0    Assessment and Plan:     1. 31 year old  female at 40w5d  2. FHT category 1.      Assessment/Plan:  IUP at 40w5d       Preeclampsia without severe features  -cervical ripening with cytotec, now spontaneous active labor     High risk pregnancy  -postdates  -IVF  -hypothyroid  -GBS neg     Carmen Roberts MD    "

## 2022-01-07 NOTE — ANESTHESIA PROCEDURE NOTES
Epidural catheter Procedure Note    Pre-Procedure   Staff -        Anesthesiologist:  Erwin Ozuna MD       Performed By: anesthesiologist       Location: OB       Pre-Anesthestic Checklist: patient identified, IV checked, risks and benefits discussed, informed consent, monitors and equipment checked, pre-op evaluation and at physician/surgeon's request  Timeout:       Correct Patient: Yes        Correct Procedure: Yes        Correct Site: Yes        Correct Position: Yes   Procedure Documentation  Procedure: epidural catheter       Diagnosis: labor       Patient Position: sitting       Patient Prep/Sterile Barriers: sterile gloves, mask, patient draped       Skin prep: Betadine       Local skin infiltrated with 3 mL of 1% lidocaine.        Insertion Site: L3-4. (midline approach).       Technique: LORT saline        ZULLY at 5 cm.       Needle Type: ToWhisky needle       Needle Gauge: 17.        Needle Length (Inches): 3.5        Catheter: 19 G.         Catheter threaded easily.         Threaded 10 cm at skin.         # of attempts: 1 and  # of redirects:  0    Assessment/Narrative         Paresthesias: No.      Test dose of 3 mL lidocaine 1.5% w/ 1:200,000 epinephrine at.         Test dose negative, 3 minutes after injection, for signs of intravascular, subdural, or intrathecal injection.       Insertion/Infusion Method: LORT saline       Aspiration negative for Heme or CSF via Epidural Catheter.     Comments:  Procedure tolerated well without apparent complications. Initial MDA bolus of 0.25% bupivacaine given. Epidural infusion (0.125% bupi with fentanyl 2mcg/ml) started at 10 ml/hr with PCEA of 5 ml q15min with a max cumulative dose of 25 ml/hr. PCEA instructions given and use encouraged PRN. Epidural expectations again reviewed and questions answered. Patient hemodynamically stable.  Patient and epidural functionality to be reassessed later via vital sign flowsheet, nursing communication, and/or patient  report.  Anesthesiologist immediately available for ongoing assessment and management.

## 2022-01-07 NOTE — PROGRESS NOTES
[facility]   Post-partum Note    Name:  Cherie Israel  MRN: [id number]    S: Patient states she is doing OK - she is currently trying to pee.  States after her 2nd IVF retrival, she needed to go the ER because she didn't urinate and she was catheterized for about 1.5L of urine.  She doesn't want this to happen again and is amenable to cath if needed.   Pain is otherwise controlled.  Tolerating regular diet without nausea or vomiting.  Ambulating without dizziness.  Lochia is like heavy period, some gushes but better than this AM.      O:   Patient Vitals for the past 24 hrs:   BP Temp Temp src Pulse Resp SpO2   01/07/22 0939 (!) 139/92 (!) 100.5  F (38.1  C) Oral 93 16 --   01/07/22 0634 (!) 143/72 -- -- -- -- --   01/07/22 0619 134/70 -- -- -- -- --   01/07/22 0600 132/73 -- -- -- -- --   01/07/22 0549 (!) 139/90 -- -- -- -- --   01/07/22 0534 139/66 -- -- -- -- --   01/07/22 0519 (!) 151/63 -- -- -- -- --   01/07/22 0504 139/61 -- -- -- -- --   01/07/22 0449 137/64 -- -- -- -- --   01/07/22 0434 (!) 152/65 -- -- -- -- --   01/07/22 0419 138/76 -- -- -- -- --   01/07/22 0404 123/60 -- -- -- -- --   01/07/22 0349 (!) 151/76 -- -- -- -- --   01/07/22 0335 -- 99.1  F (37.3  C) Oral -- -- --   01/07/22 0334 136/71 -- -- -- -- --   01/07/22 0145 134/62 97.9  F (36.6  C) -- -- -- --   01/06/22 2347 130/65 98  F (36.7  C) Oral -- 16 --   01/06/22 2245 -- 98.4  F (36.9  C) Oral -- -- --   01/06/22 2203 117/56 -- -- -- 16 --   01/06/22 2109 116/55 -- -- -- 16 --   01/06/22 2047 -- -- -- -- -- 96 %   01/06/22 2044 117/59 -- -- -- -- --   01/06/22 2042 -- -- -- -- -- 97 %   01/06/22 2037 -- -- -- -- -- 96 %   01/06/22 2032 -- -- -- -- -- 96 %   01/06/22 2027 116/55 -- -- -- -- 95 %   01/06/22 2024 122/59 98.9  F (37.2  C) Oral -- 18 --   01/06/22 2022 -- -- -- -- -- 97 %   01/06/22 2021 122/60 -- -- -- -- --   01/06/22 2018 117/56 -- -- -- -- --   01/06/22 2017 -- -- -- -- -- 97 %   01/06/22 2015 118/59 --  -- -- -- --   22 117/59 -- -- -- -- 99 %   22 (!) 144/67 -- -- -- -- --   22 -- -- -- -- -- 91 %   22 -- -- -- -- -- 99 %   22 1853 -- -- -- -- 18 --   22 1819 -- -- -- -- 18 --   22 1726 (!) 147/93 -- -- -- 16 --   22 1524 (!) 157/93 97.8  F (36.6  C) Oral -- 16 --   22 1410 (!) 146/88 98.1  F (36.7  C) Oral 60 16 --   22 1345 (!) 142/84 -- -- -- -- --   22 1336 138/85 -- -- -- -- --   22 1238 138/86 -- -- -- -- --   22 1206 134/81 -- -- -- -- --   22 1033 (!) 136/92 -- -- -- -- --   22 1019 (!) 146/89 -- -- -- -- --   22 1003 (!) 138/90 -- -- -- -- --     Gen:  Resting comfortably, NAD  CV:  Regular rate  Pulm:  Non-labored breathing.    Labs:   Component      Latest Ref Rng & Units 2022   WBC      4.0 - 11.0 10e3/uL 35.8 (H)   RBC Count      3.80 - 5.20 10e6/uL 4.40   Hemoglobin      11.7 - 15.7 g/dL 12.4   Hematocrit      35.0 - 47.0 % 38.4   MCV      78 - 100 fL 87   MCH      26.5 - 33.0 pg 28.2   MCHC      31.5 - 36.5 g/dL 32.3   RDW      10.0 - 15.0 % 12.2   Platelet Count      150 - 450 10e3/uL 241       Assessment/Plan:  31 year old  on PPD #0 s/p VAVD for maternal exhaustion c/b PPH following IOL for pre-E w/o SF.  Continue with routine postpartum management.     Pre-E w/o SF:  - Intermittent mild range BPs above   - Vitals ever 4 hours   - Daily weight   - repeat Labs tomorrow AM   - Will need BP cuff for home with 1 week BP visit     Pain: Well-controlled with ibuprofen and tylenol  Hgb: 14.0>EBL 985cc with PPH>12.4. VSS as noted above, asymptomatic. S/p Miso this AM as well as a 2nd bag of pitocin  :  S/p straight cath at 0500, difficulty now with history of urinary retention after anesthesia.  Discussed straight cath by 1100 if she hasn't gone by then.   GI:  BID Senna/Colace.  PRN Simethicone.   ID: Isolated temp of 100.5, no diagnosis of triple-I in labor.  Vitals q4h as  above.   PPx:  Encouraged ambulation   Rh: positive  Rubella: Immune    Dispo: Plan for home on PPD#2-3.     Twila Vides MD   Pager: [phone]   January 7, 2022

## 2022-01-07 NOTE — PLAN OF CARE
Dr Roberts at bedside and attempting to AROM.  No fluid has been seen since admission and amnisure was negative upon admission.  MD states she appears to be ruptured and no membranes noted over the infants head with SVE.  Patient states she noted fluid leaking 1/6 at 0545.  Per MD will note SROM at that time.

## 2022-01-07 NOTE — LACTATION NOTE
This note was copied from a baby's chart.  Lactation visit. This is Cherie's first child (Efrain), she reports he nursed well after delivery but has been sleepy and disinterested at breast since. Writer assisted with attempting latch on R breast in cradle hold and football. Cherie has smooth nipples that do not enrique with hand expression but good drops of colostrum visualized. 24mm nipple shield applied, Efrain able to latch and suck briefly before fatiguing. Colostrum visualized in shield. Writer reviewed expected  feeding behaviors in first 24 hours, encouraged STS, offering feeding frequently and hand expression. Cherie is aware she may call for lactation support as day progresses. Bedside RN updated.

## 2022-01-07 NOTE — PLAN OF CARE
Data: Cherie Israel transferred to [facility] via cart at 0700. Baby transferred via parent's arms.  Action: Receiving unit notified of transfer: Yes. Patient and family notified of room change. Report given to Laura TADEO at 0715. Belongings sent to receiving unit. Accompanied by Registered Nurse. Oriented patient to surroundings. Call light within reach. ID bands double-checked with receiving RN.  Response: Patient tolerated transfer and is stable.

## 2022-01-07 NOTE — PLAN OF CARE
VE done - pt now AL. Attempted to move pt into new position and FHTs decel prolonged with return to baseline over 4 mins as pt was repositioned back to Left lateral. Decel also occurred after pt had a cluster of uterine contractions over 6 mins. FHTS remained at baseline after pt was returned to eft lateral. Will update Dr Roberts who is in house on the unit

## 2022-01-08 PROBLEM — D62 ANEMIA DUE TO BLOOD LOSS, ACUTE: Status: ACTIVE | Noted: 2022-01-08

## 2022-01-08 LAB
ALBUMIN SERPL-MCNC: 2.2 G/DL (ref 3.4–5)
ALP SERPL-CCNC: 188 U/L (ref 40–150)
ALT SERPL W P-5'-P-CCNC: 26 U/L (ref 0–50)
ANION GAP SERPL CALCULATED.3IONS-SCNC: 8 MMOL/L (ref 3–14)
AST SERPL W P-5'-P-CCNC: 48 U/L (ref 0–45)
BILIRUB SERPL-MCNC: 0.1 MG/DL (ref 0.2–1.3)
BUN SERPL-MCNC: 17 MG/DL (ref 7–30)
CALCIUM SERPL-MCNC: 8.6 MG/DL (ref 8.5–10.1)
CHLORIDE BLD-SCNC: 107 MMOL/L (ref 94–109)
CO2 SERPL-SCNC: 21 MMOL/L (ref 20–32)
CREAT SERPL-MCNC: 0.93 MG/DL (ref 0.52–1.04)
ERYTHROCYTE [DISTWIDTH] IN BLOOD BY AUTOMATED COUNT: 12.4 % (ref 10–15)
GFR SERPL CREATININE-BSD FRML MDRD: 83 ML/MIN/1.73M2
GLUCOSE BLD-MCNC: 115 MG/DL (ref 70–99)
HCT VFR BLD AUTO: 30.1 % (ref 35–47)
HGB BLD-MCNC: 9.7 G/DL (ref 11.7–15.7)
MCH RBC QN AUTO: 28 PG (ref 26.5–33)
MCHC RBC AUTO-ENTMCNC: 32.2 G/DL (ref 31.5–36.5)
MCV RBC AUTO: 87 FL (ref 78–100)
PLATELET # BLD AUTO: 184 10E3/UL (ref 150–450)
POTASSIUM BLD-SCNC: 4.3 MMOL/L (ref 3.4–5.3)
PROT SERPL-MCNC: 6.2 G/DL (ref 6.8–8.8)
RBC # BLD AUTO: 3.47 10E6/UL (ref 3.8–5.2)
SODIUM SERPL-SCNC: 136 MMOL/L (ref 133–144)
WBC # BLD AUTO: 24.6 10E3/UL (ref 4–11)

## 2022-01-08 PROCEDURE — 36415 COLL VENOUS BLD VENIPUNCTURE: CPT | Performed by: OBSTETRICS & GYNECOLOGY

## 2022-01-08 PROCEDURE — 120N000001 HC R&B MED SURG/OB

## 2022-01-08 PROCEDURE — 250N000013 HC RX MED GY IP 250 OP 250 PS 637: Performed by: OBSTETRICS & GYNECOLOGY

## 2022-01-08 PROCEDURE — 85027 COMPLETE CBC AUTOMATED: CPT | Performed by: OBSTETRICS & GYNECOLOGY

## 2022-01-08 PROCEDURE — 80053 COMPREHEN METABOLIC PANEL: CPT | Performed by: OBSTETRICS & GYNECOLOGY

## 2022-01-08 RX ORDER — HYDROCORTISONE 2.5 %
CREAM (GRAM) TOPICAL 3 TIMES DAILY PRN
Qty: 3.5 G | Refills: 0 | Status: SHIPPED | OUTPATIENT
Start: 2022-01-08

## 2022-01-08 RX ORDER — DOCUSATE SODIUM 100 MG/1
100 CAPSULE, LIQUID FILLED ORAL 2 TIMES DAILY PRN
Qty: 60 CAPSULE | Refills: 0 | Status: SHIPPED | OUTPATIENT
Start: 2022-01-08

## 2022-01-08 RX ORDER — NIFEDIPINE 30 MG/1
30 TABLET, EXTENDED RELEASE ORAL EVERY 24 HOURS
Status: DISCONTINUED | OUTPATIENT
Start: 2022-01-08 | End: 2022-01-09 | Stop reason: HOSPADM

## 2022-01-08 RX ORDER — MODIFIED LANOLIN
OINTMENT (GRAM) TOPICAL
Qty: 7 G | Refills: 3 | Status: SHIPPED | OUTPATIENT
Start: 2022-01-08

## 2022-01-08 RX ORDER — IBUPROFEN 800 MG/1
800 TABLET, FILM COATED ORAL EVERY 8 HOURS PRN
Qty: 30 TABLET | Refills: 0 | Status: SHIPPED | OUTPATIENT
Start: 2022-01-08

## 2022-01-08 RX ADMIN — ACETAMINOPHEN 650 MG: 325 TABLET, FILM COATED ORAL at 08:25

## 2022-01-08 RX ADMIN — ACETAMINOPHEN 650 MG: 325 TABLET, FILM COATED ORAL at 18:24

## 2022-01-08 RX ADMIN — NIFEDIPINE 30 MG: 30 TABLET, FILM COATED, EXTENDED RELEASE ORAL at 18:00

## 2022-01-08 RX ADMIN — DOCUSATE SODIUM 100 MG: 100 CAPSULE ORAL at 08:25

## 2022-01-08 RX ADMIN — ACETAMINOPHEN 650 MG: 325 TABLET, FILM COATED ORAL at 22:58

## 2022-01-08 RX ADMIN — ACETAMINOPHEN 650 MG: 325 TABLET, FILM COATED ORAL at 14:21

## 2022-01-08 RX ADMIN — ACETAMINOPHEN 650 MG: 325 TABLET, FILM COATED ORAL at 03:34

## 2022-01-08 RX ADMIN — IBUPROFEN 800 MG: 800 TABLET, FILM COATED ORAL at 17:59

## 2022-01-08 RX ADMIN — IBUPROFEN 800 MG: 800 TABLET, FILM COATED ORAL at 05:16

## 2022-01-08 RX ADMIN — IBUPROFEN 800 MG: 800 TABLET, FILM COATED ORAL at 11:29

## 2022-01-08 ASSESSMENT — ACTIVITIES OF DAILY LIVING (ADL)
ADLS_ACUITY_SCORE: 3
ADLS_ACUITY_SCORE: 4
TOILETING_ISSUES: NO
ADLS_ACUITY_SCORE: 4
FALL_HISTORY_WITHIN_LAST_SIX_MONTHS: NO
ADLS_ACUITY_SCORE: 4
ADLS_ACUITY_SCORE: 3
ADLS_ACUITY_SCORE: 4

## 2022-01-08 NOTE — PLAN OF CARE
Vitals stable ex mildly elevated blood pressures. Denies headache, chest pain or visual disturbances. Independent with self and infant cares. Pain controlled with Ibuprofen and Tylenol with good relief. Breastfeeding with assistance needed for latch/positioning. Using nipple shield. Significant other at bedside and supportive.

## 2022-01-08 NOTE — PROGRESS NOTES
[facility]   Post-partum Note    Name:  Cherie Israel  MRN: [id number]    S: Patient states she is doing OK.  Pain is controlled.  Tolerating regular diet. Ambulating without dizziness. Lochia wnl. Urinating spontaneously.    O:   Patient Vitals for the past 24 hrs:   BP Temp Temp src Pulse Resp   22 0515 131/72 -- -- 79 16   22 0123 (!) 153/75 98.4  F (36.9  C) Oral 69 16   22 2056 129/71 -- -- -- --   22 1830 (!) 140/98 -- -- 79 16   22 1645 (!) 145/98 -- -- 76 16   22 1630 (!) 160/93 98.7  F (37.1  C) Oral 79 16   22 1142 131/85 98.8  F (37.1  C) Oral 82 16   22 0939 (!) 139/92 (!) 100.5  F (38.1  C) Oral 93 16     Gen:  Resting comfortably, NAD  CV:  Regular rate  Pulm:  Non-labored breathing.  ABD: Soft and non-tender  Neuro: No hyperreflexia in patella    Labs:   Component      Latest Ref Rng & Units 2022   WBC      4.0 - 11.0 10e3/uL 35.8 (H)   RBC Count      3.80 - 5.20 10e6/uL 4.40   Hemoglobin      11.7 - 15.7 g/dL 12.4   Hematocrit      35.0 - 47.0 % 38.4   MCV      78 - 100 fL 87   MCH      26.5 - 33.0 pg 28.2   MCHC      31.5 - 36.5 g/dL 32.3   RDW      10.0 - 15.0 % 12.2   Platelet Count      150 - 450 10e3/uL 241       Assessment/Plan:  31 year old  on PPD #1 s/p VAVD for maternal exhaustion c/b PPH following IOL for pre-E w/o SF.  Continue with routine postpartum management.     Pre-E w/o SF:  - BP as above  - Procardia 30 XR q/24hr   + First dose PPD0 18:23  - Vitals ever 4 hours   - Daily weight   - repeat Labs AM; repeat ordered PPD#2   + AST 22 > 48  - Will need BP cuff for home with 1 week BP visit     Pain: Well-controlled with ibuprofen and tylenol  Hgb: 14.0>EBL 985cc with PPH>12.4>9.7. VSS as noted above, asymptomatic. S/p Miso, TXA PPD#0 as well as a 2nd bag of pitocin  :  Hx urinary retention, now spontaneous voiding  GI:  BID Senna/Colace.  PRN Simethicone.   ID: Isolated temp of 100.5, no diagnosis of triple-I  in labor.    PPx:  Encouraged ambulation   Rh: positive  Rubella: Immune    Dispo: Plan for home on PPD#2-3.

## 2022-01-08 NOTE — PLAN OF CARE
VSS, Blood pressures elevated but stable. Denies headache, epigastric pain, visual disturbances and edema remains unchanged. Bonding well with baby. Pain is well controlled with tylenol and ibuprofen. Patient is voiding without difficulty and ambulating independently. Tolerating regular diet well. Independent in self and baby cares. Breast feeding is going well.

## 2022-01-08 NOTE — PLAN OF CARE
Started on Procardia 30mg for elevated BPs. Will continue with every 4hr checks. Up voiding without any problems now. Using Ibu, Tylenol, and tucks for lazara pain with good relief. Had 4 loose stools this AM but has now resolved. Working on breastfeeding with shield.  present and supportive.

## 2022-01-08 NOTE — LACTATION NOTE
"This note was copied from a baby's chart.  Lactation visit. This is Cherie's first baby (Efrain). Cherie reports breastfeeding is going \"pretty good.\" At time of visit, Efrain was latched on the right breast. Frequent swallows were heard and colostrum was seen in the nipple shield. Efrain was in a nutritive suck pattern for almost 20 minutes. Efrain then latched on the left breast. His lips were flanged and multiple swallows were heard and pointed out to Cherie. Some breast compressions were needed to keep Efrain in a nutritive suck pattern. Writer reviewed typical feeding behaviors of the first few days. Cherie asked when to start pumping. Writer discussed waiting a few weeks, as long as Efrain's weight loss and jaundice are within normal limits. Plan for lactation support as needed.  "

## 2022-01-08 NOTE — DISCHARGE SUMMARY
[facility]    Discharge Summary  Obstetrics    Date of Admission:  2022  Date of Discharge:  2022   Discharging Provider:  Twila Mario MD     Discharge Diagnoses   Patient Active Problem List   Diagnosis     Preeclampsia     Vacuum extractor delivery, delivered     Delayed postpartum hemorrhage     Anemia due to blood loss, acute       History of Present Illness   Cherie Israel is a 31 year old female now  who presented to L&D @ 40w6d with LOF and ruled out for ROM. She had mild range BP and was noted to have Pre-e w/o SF. Her pregnancy has been complicated by Hypothyroidism, IVF pregnancy. Please see her admit H&P for full details of her PMH, PSH, Meds, Allergies and exam on admit.    Hospital Course   The patient had a VAVD @ 40w6d, please see her delivery summary for full details. She had 2nd degree laceration with bilateral sulcal tears. EBL was 985mL. ABLA with Hgb dropping from 12.4 to 9.7. Patient received Miso, TXA, Pitocin for delayed hemorrhage on postpartum floor on PPD#0. On PPD#0 her BP improved with 30mg XR procardia. On POD#1 she was meeting milestones for discharge and BP controlled on medication. Her AST did double from 22 to 48. She was meeting milestones for discharge other than her BP which required her to stay another evening in the hospital. On day of discharge her AST was 36 and her Hgb was 9.4 and she was discharged home with oral iron supplementation.  She was  tolerating a regular diet without nausea and vomiting, her pain was well controlled on oral pain medicines and her lochia was appropriate. Patient reported Hx of urinary retention s/p anesthesia in the past. On PPD0 she required straight cath (she had epidural for labor and delivery). By evening of PPD0 this was improved, and she was spontaneously voiding by PPD1. Patient did have x1 isolated temperature on PPD0 without diagnosis of triple-I in labor. This resolved without other associated  signs of stx's of infection. Patient was afebrile on day of discharge.    Hemoglobin   Date Value Ref Range Status   2022 9.4 (L) 11.7 - 15.7 g/dL Final   ]   Rh+:  rhogam was not given    Contraception was discussed and will be addressed at her postpartum appointment.    Instructions:  1) Call for temperature greater than 100.4F, foul smelling vaginal discharge, bleeding more than 1 pad per hour for 2 hrs, pain not controlled by oral pain meds, severe constipation or severe nausea or vomiting.  Also pending blood pressure at home.   2) She was instructed to follow-up with her primary OB in 6 weeks for a routine postpartum visit and in 5-6 days for a blood pressure check in the office  3) She was instructed to continue her PNV on discharge if she wished to breast feed her infant.      Discharge Disposition   Discharged to home   Condition at discharge: Stable    Primary Care Physician   [facility]    Consultations This Hospital Stay   ANESTHESIOLOGY IP CONSULT  HOME CARE POST PARTUM/ IP CONSULT  LACTATION IP CONSULT    Discharge Orders      Activity    Activity as tolerated     Reason for your hospital stay    Maternity care     Follow Up    Follow up with provider in 1 week and 6 weeks for post-delivery checks     Discharge Instructions - Hypertensive disorders patients    High Blood pressure patients to follow up in clinic or via home care within one week for a blood pressure check     Breast pump - Manual/Electric    Breast Pump Documentation:  Manual/Electric Pump: To support adequate breast milk production and nutrition for infant.     I, the undersigned, certify that the above prescribed supplies are medically necessary for this patient and is both reasonable and necessary in reference to accepted standards of medical and necessary in reference to accepted standards of medical practice in the treatment of this patient's condition and is not prescribed as a convenience.     Diet     Resume previous diet     Discharge Medications   Current Discharge Medication List      START taking these medications    Details   benzocaine (AMERICAINE) 20 % external aerosol Apply to perineum as needed  Qty: 57 g, Refills: 1    Associated Diagnoses: Vacuum extractor delivery, delivered      docusate sodium (COLACE) 100 MG capsule Take 1 capsule (100 mg) by mouth 2 times daily as needed for constipation  Qty: 60 capsule, Refills: 0    Associated Diagnoses: Vacuum extractor delivery, delivered      ferrous sulfate (FEROSUL) 325 (65 Fe) MG tablet Take 1 tablet (325 mg) by mouth every other day  Qty: 30 tablet, Refills: 1    Associated Diagnoses: Anemia due to blood loss, acute      hydrocortisone 2.5 % cream Place rectally 3 times daily as needed (hemorrhoids)  Qty: 3.5 g, Refills: 0    Associated Diagnoses: Vacuum extractor delivery, delivered      ibuprofen (ADVIL/MOTRIN) 800 MG tablet Take 1 tablet (800 mg) by mouth every 8 hours as needed for other (cramping)  Qty: 30 tablet, Refills: 0    Associated Diagnoses: Vacuum extractor delivery, delivered      lanolin ointment Apply topically every hour as needed for other (sore nipples)  Qty: 7 g, Refills: 3    Associated Diagnoses: Vacuum extractor delivery, delivered      NIFEdipine ER OSMOTIC (ADALAT CC) 30 MG 24 hr tablet Take 1 tablet (30 mg) by mouth every 24 hours  Qty: 30 tablet, Refills: 1    Associated Diagnoses: Pre-eclampsia, antepartum         CONTINUE these medications which have NOT CHANGED    Details   Prenatal Vit-Fe Fumarate-FA (PRENATAL MULTIVITAMIN W/IRON) 27-0.8 MG tablet Take 1 tablet by mouth daily         STOP taking these medications       aspirin (ASA) 81 MG chewable tablet Comments:   Reason for Stopping:             Allergies   No Known Allergies

## 2022-01-09 VITALS
WEIGHT: 187.4 LBS | HEART RATE: 84 BPM | TEMPERATURE: 98.1 F | SYSTOLIC BLOOD PRESSURE: 135 MMHG | BODY MASS INDEX: 27.76 KG/M2 | RESPIRATION RATE: 16 BRPM | HEIGHT: 69 IN | OXYGEN SATURATION: 96 % | DIASTOLIC BLOOD PRESSURE: 88 MMHG

## 2022-01-09 LAB
ALBUMIN SERPL-MCNC: 2.2 G/DL (ref 3.4–5)
ALP SERPL-CCNC: 165 U/L (ref 40–150)
ALT SERPL W P-5'-P-CCNC: 27 U/L (ref 0–50)
ANION GAP SERPL CALCULATED.3IONS-SCNC: 3 MMOL/L (ref 3–14)
AST SERPL W P-5'-P-CCNC: 36 U/L (ref 0–45)
BILIRUB SERPL-MCNC: <0.1 MG/DL (ref 0.2–1.3)
BUN SERPL-MCNC: 12 MG/DL (ref 7–30)
CALCIUM SERPL-MCNC: 8.7 MG/DL (ref 8.5–10.1)
CHLORIDE BLD-SCNC: 106 MMOL/L (ref 94–109)
CO2 SERPL-SCNC: 28 MMOL/L (ref 20–32)
CREAT SERPL-MCNC: 0.92 MG/DL (ref 0.52–1.04)
ERYTHROCYTE [DISTWIDTH] IN BLOOD BY AUTOMATED COUNT: 12.9 % (ref 10–15)
GFR SERPL CREATININE-BSD FRML MDRD: 84 ML/MIN/1.73M2
GLUCOSE BLD-MCNC: 104 MG/DL (ref 70–99)
HCT VFR BLD AUTO: 28.7 % (ref 35–47)
HGB BLD-MCNC: 9.4 G/DL (ref 11.7–15.7)
MCH RBC QN AUTO: 28.2 PG (ref 26.5–33)
MCHC RBC AUTO-ENTMCNC: 32.8 G/DL (ref 31.5–36.5)
MCV RBC AUTO: 86 FL (ref 78–100)
PLATELET # BLD AUTO: 218 10E3/UL (ref 150–450)
POTASSIUM BLD-SCNC: 4.4 MMOL/L (ref 3.4–5.3)
PROT SERPL-MCNC: 6.3 G/DL (ref 6.8–8.8)
RBC # BLD AUTO: 3.33 10E6/UL (ref 3.8–5.2)
SODIUM SERPL-SCNC: 137 MMOL/L (ref 133–144)
WBC # BLD AUTO: 20.6 10E3/UL (ref 4–11)

## 2022-01-09 PROCEDURE — 82040 ASSAY OF SERUM ALBUMIN: CPT | Performed by: OBSTETRICS & GYNECOLOGY

## 2022-01-09 PROCEDURE — 250N000013 HC RX MED GY IP 250 OP 250 PS 637: Performed by: OBSTETRICS & GYNECOLOGY

## 2022-01-09 PROCEDURE — 36415 COLL VENOUS BLD VENIPUNCTURE: CPT | Performed by: OBSTETRICS & GYNECOLOGY

## 2022-01-09 PROCEDURE — 80053 COMPREHEN METABOLIC PANEL: CPT | Performed by: OBSTETRICS & GYNECOLOGY

## 2022-01-09 PROCEDURE — 85027 COMPLETE CBC AUTOMATED: CPT | Performed by: OBSTETRICS & GYNECOLOGY

## 2022-01-09 RX ORDER — NIFEDIPINE 30 MG
30 TABLET, EXTENDED RELEASE ORAL EVERY 24 HOURS
Qty: 30 TABLET | Refills: 1 | Status: SHIPPED | OUTPATIENT
Start: 2022-01-09

## 2022-01-09 RX ORDER — FERROUS SULFATE 325(65) MG
325 TABLET ORAL EVERY OTHER DAY
Qty: 30 TABLET | Refills: 1 | Status: SHIPPED | OUTPATIENT
Start: 2022-01-09

## 2022-01-09 RX ADMIN — ACETAMINOPHEN 650 MG: 325 TABLET, FILM COATED ORAL at 03:24

## 2022-01-09 RX ADMIN — ACETAMINOPHEN 650 MG: 325 TABLET, FILM COATED ORAL at 12:20

## 2022-01-09 RX ADMIN — IBUPROFEN 800 MG: 800 TABLET, FILM COATED ORAL at 06:14

## 2022-01-09 RX ADMIN — IBUPROFEN 800 MG: 800 TABLET, FILM COATED ORAL at 12:20

## 2022-01-09 RX ADMIN — DOCUSATE SODIUM 100 MG: 100 CAPSULE ORAL at 07:39

## 2022-01-09 RX ADMIN — ACETAMINOPHEN 650 MG: 325 TABLET, FILM COATED ORAL at 07:39

## 2022-01-09 RX ADMIN — IBUPROFEN 800 MG: 800 TABLET, FILM COATED ORAL at 00:09

## 2022-01-09 ASSESSMENT — ACTIVITIES OF DAILY LIVING (ADL)
ADLS_ACUITY_SCORE: 3

## 2022-01-09 ASSESSMENT — MIFFLIN-ST. JEOR: SCORE: 1619.42

## 2022-01-09 NOTE — DISCHARGE INSTRUCTIONS
Check BP at home twice a day.  If BP consistently greater than systolic 150 or diastolic 100, message the office.  If BP systolic 160 or diastolic 110, call the office and/or go to ER.    Postpartum Vaginal Delivery Instructions    Activity       Ask family and friends for help when you need it.    Do not place anything in your vagina for 6 weeks.    You are not restricted on other activities, but take it easy for a few weeks to allow your body to recover from delivery.  You are able to do any activities you feel up to that point.    No driving until you have stopped taking your pain medications (usually two weeks after delivery).     Call your health care provider if you have any of these symptoms:       Increased pain, swelling, redness, or fluid around your stiches from an episiotomy or perineal tear.    A fever above 100.4 F (38 C) with or without chills when placing a thermometer under your tongue.    You soak a sanitary pad with blood within 1 hour, or you see blood clots larger than a golf ball.    Bleeding that lasts more than 6 weeks.    Vaginal discharge that smells bad.    Severe pain, cramping or tenderness in your lower belly area.    A need to urinate more frequently (use the toilet more often), more urgently (use the toilet very quickly), or it burns when you urinate.    Nausea and vomiting.    Redness, swelling or pain around a vein in your leg.    Problems breastfeeding or a red or painful area on your breast.    Chest pain and cough or are gasping for air.    Problems coping with sadness, anxiety, or depression.  If you have any concerns about hurting yourself or the baby, call your provider immediately.     You have questions or concerns after you return home.     Keep your hands clean:  Always wash your hands before touching your perineal area and stitches.  This helps reduce your risk of infection.  If your hands aren't dirty, you may use an alcohol hand-rub to clean your hands. Keep your nails  clean and short.

## 2022-01-09 NOTE — PLAN OF CARE
Data: Vital signs within normal limits. Postpartum checks within normal limits - see flow record. Patient eating and drinking normally. Patient able to empty bladder independently and is up ambulating. No apparent signs of infection. Patient performing self cares and is able to care for infant.  Action: Patient medicated during the shift for cramping. See MAR. Patient reassessed within 1 hour after each medication and pain was improved - patient stated she was comfortable. Patient education done completed. See flow record.  Response: Positive attachment behaviors observed with infant. Support persons father of infant present and attentive to both mother and infant needs.   Plan: Discharged to home today with all patient belongings. AVS read to patient, all questions and concerns addressed.

## 2022-01-09 NOTE — PROVIDER NOTIFICATION
01/09/22 09   Provider Notification   Provider Name/Title Dr Mario   Method of Notification In Department   Per Dr Mario patient may discharge to home after lunch as long as blood pressures remain in the 140's-90s range. Pt to take BP three times daily and keep log to bring in to appointment Thursday or Friday. RN to reinforce education on proper blood pressure technique and signs and symptoms to report to the clinic.

## 2022-01-09 NOTE — PROVIDER NOTIFICATION
01/08/22 1810   Provider Notification   Provider Name/Title Dr Ceja   Method of Notification Electronic Page   Request Evaluate-Remote   Notification Reason Vital Signs Change   MD updated recent blood pressure was 154/94. Pt given the scheduled oral procardia. Orders to recheck blood pressure and page MD if blood pressure is above 150/100. If below ok to return to Q4 blood pressures.

## 2022-01-09 NOTE — PROGRESS NOTES
"[facility]   Post-partum Note    Name:  Cherie Israel  MRN: [id number]    S: Patient states she is doing well, ready to go home. Pain is otherwise controlled.  Tolerating regular diet without nausea or vomiting.  Spontaneously voiding, passing flatus and has had a BM. States she can feel the \"stiches more today.\"  Ambulating without dizziness.  Lochia similar to menses. Patient currently denies any headache, vision changes, shortness of breath, chest pain or RUQ/epigastric abdominal pain.      O:   Patient Vitals for the past 24 hrs:   BP Temp Temp src Pulse Resp   01/09/22 0620 138/73 -- -- -- --   01/09/22 0325 (!) 140/90 -- -- -- --   01/09/22 0009 (!) 140/75 98.4  F (36.9  C) Oral 73 14   01/08/22 1948 127/73 98.8  F (37.1  C) Oral 88 14   01/08/22 1759 (!) 154/94 98.2  F (36.8  C) Oral 82 16   01/08/22 1428 133/70 -- -- 87 16     Gen:  Resting comfortably, NAD  CV:  Regular rate  Pulm:  Non-labored breathing.    Labs:   Component      Latest Ref Rng & Units 1/9/2022   Sodium      133 - 144 mmol/L 137   Potassium      3.4 - 5.3 mmol/L 4.4   Chloride      94 - 109 mmol/L 106   Carbon Dioxide      20 - 32 mmol/L 28   Anion Gap      3 - 14 mmol/L 3   Urea Nitrogen      7 - 30 mg/dL 12   Creatinine      0.52 - 1.04 mg/dL 0.92   Calcium      8.5 - 10.1 mg/dL 8.7   Glucose      70 - 99 mg/dL 104 (H)   Alkaline Phosphatase      40 - 150 U/L 165 (H)   AST      0 - 45 U/L 36   ALT      0 - 50 U/L 27   Protein Total      6.8 - 8.8 g/dL 6.3 (L)   Albumin      3.4 - 5.0 g/dL 2.2 (L)   Bilirubin Total      0.2 - 1.3 mg/dL <0.1 (L)   GFR Estimate      >60 mL/min/1.73m2 84   WBC      4.0 - 11.0 10e3/uL 20.6 (H)   RBC Count      3.80 - 5.20 10e6/uL 3.33 (L)   Hemoglobin      11.7 - 15.7 g/dL 9.4 (L)   Hematocrit      35.0 - 47.0 % 28.7 (L)   MCV      78 - 100 fL 86   MCH      26.5 - 33.0 pg 28.2   MCHC      31.5 - 36.5 g/dL 32.8   RDW      10.0 - 15.0 % 12.9   Platelet Count      150 - 450 10e3/uL 218 "         Assessment/Plan:  31 year old  on PPD #2 s/p VAVD for maternal exhaustion c/b PPH following IOL for pre-E w/o SF.  Continue with routine postpartum management.     Pre-E w/o SF:  - Mild Range BPs above on 30 mg Procardia   - Vitals ever 4 hours   - Daily weight   - HELLP labs as above, normalization of AST  - Has BP cuff at home     Pain: Well-controlled with ibuprofen and tylenol  Hgb: 14.0>EBL 985cc with PPH>12.4>>9.4. PO iron on discharge.   :  Voiding spontaneously   GI:  BID Senna/Colace.  PRN Simethicone.   ID: Afebrile for 48 hours   PPx:  Encouraged ambulation   Rh: positive  Rubella: Immune    Dispo: Plan for home this afternoon with BP check at the end of the week, taking BP at home 2-3/day.  Call with >160/110. nurse line number again reviewed.     Twila Vides MD   Pager: [phone]   2022

## 2022-01-09 NOTE — PLAN OF CARE
Pt meeting expected outcomes. Vitals stable, monitoring BP's Q 4 hours, 140's/90's this shift. Denies s/s of pre-eclampsia. Trace BLE edema. Fundus firm and midline. Denies difficulty voiding. Pain controlled with ibuprofen and tylenol. Independent with self cares. Breastfeeding , states it's going well, however infant at 9.6% weight loss.  Lactation saw yesterday.

## 2022-03-27 ENCOUNTER — HEALTH MAINTENANCE LETTER (OUTPATIENT)
Age: 32
End: 2022-03-27

## 2022-09-24 ENCOUNTER — HEALTH MAINTENANCE LETTER (OUTPATIENT)
Age: 32
End: 2022-09-24

## 2023-05-08 ENCOUNTER — HEALTH MAINTENANCE LETTER (OUTPATIENT)
Age: 33
End: 2023-05-08

## 2024-01-19 NOTE — PROVIDER NOTIFICATION
01/07/22 1720   Provider Notification   Provider Name/Title Dr Burdikc   Method of Notification Phone   Request Evaluate-Remote   Notification Reason Vital Signs Change   Notified of /93 and 145/98 (15 min apart). No other s/s. TORB to start Procardia XR 30mg now and give second dose in AM. Pt updated re: plan of care.    Onset: 1 week ago     Location / description: felt lightheaded, head felt tight, body felt like it would \"give out\" - worsened, lost consciousness on 1/12/2024 and 1/14/2024. Did hit her head and has a black eye from syncope on 1/14/2024. She will get pale, HR will drop and then skyrocket when these episodes happen - from 50 BPM to 120 BPM. Unsure if it has gone below 50 BPM.  Precipitating Factors: history of epilepsy - did speak with neurologist, Dr. King (see 1/17/2024 encounter).   Pain Scale (1-10), 10 highest: 0/10 - no pain.  What improves/worsens symptoms: if she sits, she can avoid LOC. Sometimes it will kick in when she is walking. Notices it when she is sitting to standing.  Symptom specific medications: no - states Dr. King is aware of all medications/supplements and does not think they would cause these issues.  LMP : irregular - a few weeks ago.   Are you pregnant or breast feeding: no.  Recent visits (last 3-4 weeks) for same reason or recent surgery:  no.    PLAN:    Call 911    Unsure if patient/caller will follow recommendations. RN strongly recommended immediate 911 call - unsure if she is agreeable. She does desire sooner appointment with PCP or partner, currently scheduled 1/29/2024. RN found soonest available appointment on 2/6/2024. RN told pt encounter will be sent to PCP pool for review, and RN will also try to contact clinic as well. She verbalized understanding and had no further questions at this time. Aware to call back with new/worsening symptoms, or if at all needed.    RN called clinic back line, representative confirmed no sooner appointments are available.    Reason for Disposition   Extra heartbeats, irregular heart beating, or heart is beating very fast (i.e., 'palpitations')    Protocols used: Bsdhorcx-B-WV

## 2024-07-14 ENCOUNTER — HEALTH MAINTENANCE LETTER (OUTPATIENT)
Age: 34
End: 2024-07-14

## 2024-09-20 ENCOUNTER — MEDICAL CORRESPONDENCE (OUTPATIENT)
Dept: HEALTH INFORMATION MANAGEMENT | Facility: CLINIC | Age: 34
End: 2024-09-20
Payer: COMMERCIAL

## 2024-11-23 ENCOUNTER — HOSPITAL ENCOUNTER (INPATIENT)
Facility: CLINIC | Age: 34
LOS: 5 days | Discharge: HOME | End: 2024-11-28
Attending: STUDENT IN AN ORGANIZED HEALTH CARE EDUCATION/TRAINING PROGRAM | Admitting: STUDENT IN AN ORGANIZED HEALTH CARE EDUCATION/TRAINING PROGRAM
Payer: COMMERCIAL

## 2024-11-23 DIAGNOSIS — O14.93 PREECLAMPSIA, THIRD TRIMESTER: ICD-10-CM

## 2024-11-23 PROBLEM — Z34.90 ENCOUNTER FOR INDUCTION OF LABOR: Status: ACTIVE | Noted: 2024-11-23

## 2024-11-23 LAB
ABO/RH(D): NORMAL
ALBUMIN MFR UR ELPH: 20.6 MG/DL
ALBUMIN SERPL BCG-MCNC: 3.4 G/DL (ref 3.5–5.2)
ALP SERPL-CCNC: 460 U/L (ref 40–150)
ALT SERPL W P-5'-P-CCNC: 19 U/L (ref 0–50)
ANION GAP SERPL CALCULATED.3IONS-SCNC: 13 MMOL/L (ref 7–15)
ANTIBODY SCREEN: NEGATIVE
AST SERPL W P-5'-P-CCNC: 26 U/L (ref 0–45)
BILIRUB SERPL-MCNC: <0.2 MG/DL
BUN SERPL-MCNC: 16.8 MG/DL (ref 6–20)
CALCIUM SERPL-MCNC: 8.5 MG/DL (ref 8.8–10.4)
CHLORIDE SERPL-SCNC: 102 MMOL/L (ref 98–107)
CREAT SERPL-MCNC: 0.9 MG/DL (ref 0.51–0.95)
CREAT UR-MCNC: 35 MG/DL
EGFRCR SERPLBLD CKD-EPI 2021: 85 ML/MIN/1.73M2
ERYTHROCYTE [DISTWIDTH] IN BLOOD BY AUTOMATED COUNT: 12.8 % (ref 10–15)
GLUCOSE SERPL-MCNC: 100 MG/DL (ref 70–99)
HCO3 SERPL-SCNC: 17 MMOL/L (ref 22–29)
HCT VFR BLD AUTO: 38.6 % (ref 35–47)
HGB BLD-MCNC: 12.9 G/DL (ref 11.7–15.7)
MCH RBC QN AUTO: 27.4 PG (ref 26.5–33)
MCHC RBC AUTO-ENTMCNC: 33.4 G/DL (ref 31.5–36.5)
MCV RBC AUTO: 82 FL (ref 78–100)
PLATELET # BLD AUTO: 225 10E3/UL (ref 150–450)
POTASSIUM SERPL-SCNC: 4 MMOL/L (ref 3.4–5.3)
PROT SERPL-MCNC: 6.5 G/DL (ref 6.4–8.3)
PROT/CREAT 24H UR: 0.59 MG/MG CR (ref 0–0.2)
RBC # BLD AUTO: 4.7 10E6/UL (ref 3.8–5.2)
SODIUM SERPL-SCNC: 132 MMOL/L (ref 135–145)
SPECIMEN EXPIRATION DATE: NORMAL
WBC # BLD AUTO: 12.3 10E3/UL (ref 4–11)

## 2024-11-23 PROCEDURE — 120N000001 HC R&B MED SURG/OB

## 2024-11-23 PROCEDURE — 82040 ASSAY OF SERUM ALBUMIN: CPT | Performed by: STUDENT IN AN ORGANIZED HEALTH CARE EDUCATION/TRAINING PROGRAM

## 2024-11-23 PROCEDURE — 250N000013 HC RX MED GY IP 250 OP 250 PS 637: Performed by: STUDENT IN AN ORGANIZED HEALTH CARE EDUCATION/TRAINING PROGRAM

## 2024-11-23 PROCEDURE — 86900 BLOOD TYPING SEROLOGIC ABO: CPT | Performed by: STUDENT IN AN ORGANIZED HEALTH CARE EDUCATION/TRAINING PROGRAM

## 2024-11-23 PROCEDURE — 85041 AUTOMATED RBC COUNT: CPT | Performed by: STUDENT IN AN ORGANIZED HEALTH CARE EDUCATION/TRAINING PROGRAM

## 2024-11-23 PROCEDURE — 86850 RBC ANTIBODY SCREEN: CPT | Performed by: STUDENT IN AN ORGANIZED HEALTH CARE EDUCATION/TRAINING PROGRAM

## 2024-11-23 PROCEDURE — 86780 TREPONEMA PALLIDUM: CPT | Performed by: STUDENT IN AN ORGANIZED HEALTH CARE EDUCATION/TRAINING PROGRAM

## 2024-11-23 PROCEDURE — 80053 COMPREHEN METABOLIC PANEL: CPT | Performed by: STUDENT IN AN ORGANIZED HEALTH CARE EDUCATION/TRAINING PROGRAM

## 2024-11-23 PROCEDURE — 85018 HEMOGLOBIN: CPT | Performed by: STUDENT IN AN ORGANIZED HEALTH CARE EDUCATION/TRAINING PROGRAM

## 2024-11-23 PROCEDURE — 36415 COLL VENOUS BLD VENIPUNCTURE: CPT | Performed by: STUDENT IN AN ORGANIZED HEALTH CARE EDUCATION/TRAINING PROGRAM

## 2024-11-23 PROCEDURE — 84156 ASSAY OF PROTEIN URINE: CPT | Performed by: STUDENT IN AN ORGANIZED HEALTH CARE EDUCATION/TRAINING PROGRAM

## 2024-11-23 RX ORDER — KETOROLAC TROMETHAMINE 30 MG/ML
30 INJECTION, SOLUTION INTRAMUSCULAR; INTRAVENOUS
Status: DISCONTINUED | OUTPATIENT
Start: 2024-11-23 | End: 2024-11-24

## 2024-11-23 RX ORDER — NALOXONE HYDROCHLORIDE 0.4 MG/ML
0.4 INJECTION, SOLUTION INTRAMUSCULAR; INTRAVENOUS; SUBCUTANEOUS
Status: DISCONTINUED | OUTPATIENT
Start: 2024-11-23 | End: 2024-11-24 | Stop reason: HOSPADM

## 2024-11-23 RX ORDER — CITRIC ACID/SODIUM CITRATE 334-500MG
30 SOLUTION, ORAL ORAL
Status: DISCONTINUED | OUTPATIENT
Start: 2024-11-23 | End: 2024-11-24 | Stop reason: HOSPADM

## 2024-11-23 RX ORDER — CARBOPROST TROMETHAMINE 250 UG/ML
250 INJECTION, SOLUTION INTRAMUSCULAR
Status: DISCONTINUED | OUTPATIENT
Start: 2024-11-23 | End: 2024-11-24 | Stop reason: HOSPADM

## 2024-11-23 RX ORDER — LEVOTHYROXINE SODIUM 50 UG/1
50 TABLET ORAL DAILY
COMMUNITY

## 2024-11-23 RX ORDER — OXYTOCIN/0.9 % SODIUM CHLORIDE 30/500 ML
100-340 PLASTIC BAG, INJECTION (ML) INTRAVENOUS CONTINUOUS PRN
Status: DISCONTINUED | OUTPATIENT
Start: 2024-11-23 | End: 2024-11-25

## 2024-11-23 RX ORDER — ONDANSETRON 4 MG/1
4 TABLET, ORALLY DISINTEGRATING ORAL EVERY 6 HOURS PRN
Status: DISCONTINUED | OUTPATIENT
Start: 2024-11-23 | End: 2024-11-24 | Stop reason: HOSPADM

## 2024-11-23 RX ORDER — NALOXONE HYDROCHLORIDE 0.4 MG/ML
0.2 INJECTION, SOLUTION INTRAMUSCULAR; INTRAVENOUS; SUBCUTANEOUS
Status: DISCONTINUED | OUTPATIENT
Start: 2024-11-23 | End: 2024-11-24 | Stop reason: HOSPADM

## 2024-11-23 RX ORDER — TERBUTALINE SULFATE 1 MG/ML
0.25 INJECTION, SOLUTION SUBCUTANEOUS
Status: DISCONTINUED | OUTPATIENT
Start: 2024-11-23 | End: 2024-11-24 | Stop reason: HOSPADM

## 2024-11-23 RX ORDER — METOCLOPRAMIDE HYDROCHLORIDE 5 MG/ML
10 INJECTION INTRAMUSCULAR; INTRAVENOUS EVERY 6 HOURS PRN
Status: DISCONTINUED | OUTPATIENT
Start: 2024-11-23 | End: 2024-11-24 | Stop reason: HOSPADM

## 2024-11-23 RX ORDER — TRANEXAMIC ACID 10 MG/ML
1 INJECTION, SOLUTION INTRAVENOUS EVERY 30 MIN PRN
Status: DISCONTINUED | OUTPATIENT
Start: 2024-11-23 | End: 2024-11-24 | Stop reason: HOSPADM

## 2024-11-23 RX ORDER — LEVOTHYROXINE SODIUM 50 UG/1
50 TABLET ORAL
Status: DISCONTINUED | OUTPATIENT
Start: 2024-11-24 | End: 2024-11-28 | Stop reason: HOSPADM

## 2024-11-23 RX ORDER — HYDROXYZINE HYDROCHLORIDE 50 MG/1
50 TABLET, FILM COATED ORAL EVERY 6 HOURS PRN
Status: DISCONTINUED | OUTPATIENT
Start: 2024-11-23 | End: 2024-11-28 | Stop reason: HOSPADM

## 2024-11-23 RX ORDER — PROCHLORPERAZINE MALEATE 10 MG
10 TABLET ORAL EVERY 6 HOURS PRN
Status: DISCONTINUED | OUTPATIENT
Start: 2024-11-23 | End: 2024-11-24 | Stop reason: HOSPADM

## 2024-11-23 RX ORDER — MISOPROSTOL 200 UG/1
400 TABLET ORAL
Status: DISCONTINUED | OUTPATIENT
Start: 2024-11-23 | End: 2024-11-24 | Stop reason: HOSPADM

## 2024-11-23 RX ORDER — ONDANSETRON 2 MG/ML
4 INJECTION INTRAMUSCULAR; INTRAVENOUS EVERY 6 HOURS PRN
Status: DISCONTINUED | OUTPATIENT
Start: 2024-11-23 | End: 2024-11-24 | Stop reason: HOSPADM

## 2024-11-23 RX ORDER — ASPIRIN 81 MG/1
81 TABLET, CHEWABLE ORAL DAILY
Status: ON HOLD | COMMUNITY
End: 2024-11-26

## 2024-11-23 RX ORDER — MISOPROSTOL 100 UG/1
25 TABLET ORAL
Status: DISCONTINUED | OUTPATIENT
Start: 2024-11-23 | End: 2024-11-24 | Stop reason: HOSPADM

## 2024-11-23 RX ORDER — OXYTOCIN 10 [USP'U]/ML
10 INJECTION, SOLUTION INTRAMUSCULAR; INTRAVENOUS
Status: DISCONTINUED | OUTPATIENT
Start: 2024-11-23 | End: 2024-11-25

## 2024-11-23 RX ORDER — METHYLERGONOVINE MALEATE 0.2 MG/ML
200 INJECTION INTRAVENOUS
Status: DISCONTINUED | OUTPATIENT
Start: 2024-11-23 | End: 2024-11-24 | Stop reason: HOSPADM

## 2024-11-23 RX ORDER — IBUPROFEN 800 MG/1
800 TABLET, FILM COATED ORAL
Status: DISCONTINUED | OUTPATIENT
Start: 2024-11-23 | End: 2024-11-24

## 2024-11-23 RX ORDER — HYDROXYZINE HYDROCHLORIDE 50 MG/1
50 TABLET, FILM COATED ORAL EVERY 6 HOURS PRN
Status: DISCONTINUED | OUTPATIENT
Start: 2024-11-23 | End: 2024-11-23

## 2024-11-23 RX ORDER — FENTANYL CITRATE 50 UG/ML
100 INJECTION, SOLUTION INTRAMUSCULAR; INTRAVENOUS
Status: DISCONTINUED | OUTPATIENT
Start: 2024-11-23 | End: 2024-11-24 | Stop reason: HOSPADM

## 2024-11-23 RX ORDER — METOCLOPRAMIDE 10 MG/1
10 TABLET ORAL EVERY 6 HOURS PRN
Status: DISCONTINUED | OUTPATIENT
Start: 2024-11-23 | End: 2024-11-24 | Stop reason: HOSPADM

## 2024-11-23 RX ORDER — OXYTOCIN 10 [USP'U]/ML
10 INJECTION, SOLUTION INTRAMUSCULAR; INTRAVENOUS
Status: DISCONTINUED | OUTPATIENT
Start: 2024-11-23 | End: 2024-11-24 | Stop reason: HOSPADM

## 2024-11-23 RX ORDER — LOPERAMIDE HYDROCHLORIDE 2 MG/1
4 CAPSULE ORAL
Status: DISCONTINUED | OUTPATIENT
Start: 2024-11-23 | End: 2024-11-24 | Stop reason: HOSPADM

## 2024-11-23 RX ORDER — SODIUM CHLORIDE, SODIUM LACTATE, POTASSIUM CHLORIDE, CALCIUM CHLORIDE 600; 310; 30; 20 MG/100ML; MG/100ML; MG/100ML; MG/100ML
INJECTION, SOLUTION INTRAVENOUS CONTINUOUS
Status: DISCONTINUED | OUTPATIENT
Start: 2024-11-23 | End: 2024-11-24 | Stop reason: HOSPADM

## 2024-11-23 RX ORDER — OXYTOCIN/0.9 % SODIUM CHLORIDE 30/500 ML
340 PLASTIC BAG, INJECTION (ML) INTRAVENOUS CONTINUOUS PRN
Status: DISCONTINUED | OUTPATIENT
Start: 2024-11-23 | End: 2024-11-24 | Stop reason: HOSPADM

## 2024-11-23 RX ORDER — CITRIC ACID/SODIUM CITRATE 334-500MG
30 SOLUTION, ORAL ORAL ONCE
Status: DISCONTINUED | OUTPATIENT
Start: 2024-11-23 | End: 2024-11-24 | Stop reason: HOSPADM

## 2024-11-23 RX ORDER — MISOPROSTOL 200 UG/1
800 TABLET ORAL
Status: DISCONTINUED | OUTPATIENT
Start: 2024-11-23 | End: 2024-11-24 | Stop reason: HOSPADM

## 2024-11-23 RX ORDER — LOPERAMIDE HYDROCHLORIDE 2 MG/1
2 CAPSULE ORAL
Status: DISCONTINUED | OUTPATIENT
Start: 2024-11-23 | End: 2024-11-24 | Stop reason: HOSPADM

## 2024-11-23 RX ADMIN — HYDROXYZINE HYDROCHLORIDE 50 MG: 50 TABLET, FILM COATED ORAL at 22:28

## 2024-11-23 RX ADMIN — MISOPROSTOL 25 MCG: 100 TABLET ORAL at 23:40

## 2024-11-23 RX ADMIN — MISOPROSTOL 25 MCG: 100 TABLET ORAL at 21:49

## 2024-11-23 ASSESSMENT — ACTIVITIES OF DAILY LIVING (ADL)
ADLS_ACUITY_SCORE: 0

## 2024-11-24 ENCOUNTER — ANESTHESIA (OUTPATIENT)
Dept: OBGYN | Facility: CLINIC | Age: 34
End: 2024-11-24
Payer: COMMERCIAL

## 2024-11-24 ENCOUNTER — ANESTHESIA EVENT (OUTPATIENT)
Dept: OBGYN | Facility: CLINIC | Age: 34
End: 2024-11-24
Payer: COMMERCIAL

## 2024-11-24 LAB
ALBUMIN SERPL BCG-MCNC: 2.5 G/DL (ref 3.5–5.2)
ALP SERPL-CCNC: 343 U/L (ref 40–150)
ALT SERPL W P-5'-P-CCNC: 13 U/L (ref 0–50)
ANION GAP SERPL CALCULATED.3IONS-SCNC: 13 MMOL/L (ref 7–15)
AST SERPL W P-5'-P-CCNC: 22 U/L (ref 0–45)
BILIRUB SERPL-MCNC: <0.2 MG/DL
BUN SERPL-MCNC: 13.2 MG/DL (ref 6–20)
CALCIUM SERPL-MCNC: 6.7 MG/DL (ref 8.8–10.4)
CHLORIDE SERPL-SCNC: 108 MMOL/L (ref 98–107)
CREAT SERPL-MCNC: 0.86 MG/DL (ref 0.51–0.95)
EGFRCR SERPLBLD CKD-EPI 2021: 89 ML/MIN/1.73M2
ERYTHROCYTE [DISTWIDTH] IN BLOOD BY AUTOMATED COUNT: 12.8 % (ref 10–15)
GLUCOSE SERPL-MCNC: 102 MG/DL (ref 70–99)
HCO3 SERPL-SCNC: 17 MMOL/L (ref 22–29)
HCT VFR BLD AUTO: 35.8 % (ref 35–47)
HGB BLD-MCNC: 11.8 G/DL (ref 11.7–15.7)
MCH RBC QN AUTO: 27.6 PG (ref 26.5–33)
MCHC RBC AUTO-ENTMCNC: 33 G/DL (ref 31.5–36.5)
MCV RBC AUTO: 84 FL (ref 78–100)
PLATELET # BLD AUTO: 201 10E3/UL (ref 150–450)
POTASSIUM SERPL-SCNC: 3.6 MMOL/L (ref 3.4–5.3)
PROT SERPL-MCNC: 4.8 G/DL (ref 6.4–8.3)
RBC # BLD AUTO: 4.28 10E6/UL (ref 3.8–5.2)
SODIUM SERPL-SCNC: 138 MMOL/L (ref 135–145)
T PALLIDUM AB SER QL: NONREACTIVE
WBC # BLD AUTO: 14.3 10E3/UL (ref 4–11)

## 2024-11-24 PROCEDURE — 722N000001 HC LABOR CARE VAGINAL DELIVERY SINGLE

## 2024-11-24 PROCEDURE — 84155 ASSAY OF PROTEIN SERUM: CPT | Performed by: OBSTETRICS & GYNECOLOGY

## 2024-11-24 PROCEDURE — 120N000001 HC R&B MED SURG/OB

## 2024-11-24 PROCEDURE — 85014 HEMATOCRIT: CPT | Performed by: OBSTETRICS & GYNECOLOGY

## 2024-11-24 PROCEDURE — 250N000011 HC RX IP 250 OP 636: Mod: JZ | Performed by: ANESTHESIOLOGY

## 2024-11-24 PROCEDURE — 370N000003 HC ANESTHESIA WARD SERVICE: Performed by: ANESTHESIOLOGY

## 2024-11-24 PROCEDURE — 84075 ASSAY ALKALINE PHOSPHATASE: CPT | Performed by: OBSTETRICS & GYNECOLOGY

## 2024-11-24 PROCEDURE — 250N000009 HC RX 250: Performed by: OBSTETRICS & GYNECOLOGY

## 2024-11-24 PROCEDURE — 10907ZC DRAINAGE OF AMNIOTIC FLUID, THERAPEUTIC FROM PRODUCTS OF CONCEPTION, VIA NATURAL OR ARTIFICIAL OPENING: ICD-10-PCS | Performed by: OBSTETRICS & GYNECOLOGY

## 2024-11-24 PROCEDURE — 250N000013 HC RX MED GY IP 250 OP 250 PS 637: Performed by: STUDENT IN AN ORGANIZED HEALTH CARE EDUCATION/TRAINING PROGRAM

## 2024-11-24 PROCEDURE — 3E0R3BZ INTRODUCTION OF ANESTHETIC AGENT INTO SPINAL CANAL, PERCUTANEOUS APPROACH: ICD-10-PCS | Performed by: ANESTHESIOLOGY

## 2024-11-24 PROCEDURE — 00HU33Z INSERTION OF INFUSION DEVICE INTO SPINAL CANAL, PERCUTANEOUS APPROACH: ICD-10-PCS | Performed by: ANESTHESIOLOGY

## 2024-11-24 PROCEDURE — 258N000003 HC RX IP 258 OP 636: Performed by: OBSTETRICS & GYNECOLOGY

## 2024-11-24 PROCEDURE — 36415 COLL VENOUS BLD VENIPUNCTURE: CPT | Performed by: OBSTETRICS & GYNECOLOGY

## 2024-11-24 PROCEDURE — 0KQM0ZZ REPAIR PERINEUM MUSCLE, OPEN APPROACH: ICD-10-PCS | Performed by: OBSTETRICS & GYNECOLOGY

## 2024-11-24 PROCEDURE — 250N000011 HC RX IP 250 OP 636: Performed by: ANESTHESIOLOGY

## 2024-11-24 PROCEDURE — 250N000013 HC RX MED GY IP 250 OP 250 PS 637: Performed by: OBSTETRICS & GYNECOLOGY

## 2024-11-24 RX ORDER — MODIFIED LANOLIN
OINTMENT (GRAM) TOPICAL
Status: DISCONTINUED | OUTPATIENT
Start: 2024-11-24 | End: 2024-11-28 | Stop reason: HOSPADM

## 2024-11-24 RX ORDER — SODIUM CHLORIDE, SODIUM LACTATE, POTASSIUM CHLORIDE, CALCIUM CHLORIDE 600; 310; 30; 20 MG/100ML; MG/100ML; MG/100ML; MG/100ML
INJECTION, SOLUTION INTRAVENOUS CONTINUOUS PRN
Status: DISCONTINUED | OUTPATIENT
Start: 2024-11-24 | End: 2024-11-24 | Stop reason: HOSPADM

## 2024-11-24 RX ORDER — MISOPROSTOL 200 UG/1
400 TABLET ORAL
Status: DISCONTINUED | OUTPATIENT
Start: 2024-11-24 | End: 2024-11-28 | Stop reason: HOSPADM

## 2024-11-24 RX ORDER — FENTANYL CITRATE-0.9 % NACL/PF 10 MCG/ML
100 PLASTIC BAG, INJECTION (ML) INTRAVENOUS EVERY 5 MIN PRN
Status: DISCONTINUED | OUTPATIENT
Start: 2024-11-24 | End: 2024-11-24 | Stop reason: HOSPADM

## 2024-11-24 RX ORDER — CARBOPROST TROMETHAMINE 250 UG/ML
250 INJECTION, SOLUTION INTRAMUSCULAR
Status: DISCONTINUED | OUTPATIENT
Start: 2024-11-24 | End: 2024-11-28 | Stop reason: HOSPADM

## 2024-11-24 RX ORDER — LOPERAMIDE HYDROCHLORIDE 2 MG/1
2 CAPSULE ORAL
Status: DISCONTINUED | OUTPATIENT
Start: 2024-11-24 | End: 2024-11-28 | Stop reason: HOSPADM

## 2024-11-24 RX ORDER — DOCUSATE SODIUM 100 MG/1
100 CAPSULE, LIQUID FILLED ORAL DAILY
Status: DISCONTINUED | OUTPATIENT
Start: 2024-11-24 | End: 2024-11-28 | Stop reason: HOSPADM

## 2024-11-24 RX ORDER — BUPIVACAINE HYDROCHLORIDE 2.5 MG/ML
INJECTION, SOLUTION EPIDURAL; INFILTRATION; INTRACAUDAL
Status: COMPLETED | OUTPATIENT
Start: 2024-11-24 | End: 2024-11-24

## 2024-11-24 RX ORDER — IBUPROFEN 800 MG/1
800 TABLET, FILM COATED ORAL EVERY 6 HOURS PRN
Status: DISCONTINUED | OUTPATIENT
Start: 2024-11-24 | End: 2024-11-28 | Stop reason: HOSPADM

## 2024-11-24 RX ORDER — LOPERAMIDE HYDROCHLORIDE 2 MG/1
4 CAPSULE ORAL
Status: DISCONTINUED | OUTPATIENT
Start: 2024-11-24 | End: 2024-11-28 | Stop reason: HOSPADM

## 2024-11-24 RX ORDER — ACETAMINOPHEN 325 MG/1
650 TABLET ORAL EVERY 4 HOURS PRN
Status: DISCONTINUED | OUTPATIENT
Start: 2024-11-24 | End: 2024-11-28 | Stop reason: HOSPADM

## 2024-11-24 RX ORDER — OXYTOCIN/0.9 % SODIUM CHLORIDE 30/500 ML
1-24 PLASTIC BAG, INJECTION (ML) INTRAVENOUS CONTINUOUS
Status: DISCONTINUED | OUTPATIENT
Start: 2024-11-24 | End: 2024-11-24 | Stop reason: HOSPADM

## 2024-11-24 RX ORDER — BISACODYL 10 MG
10 SUPPOSITORY, RECTAL RECTAL DAILY PRN
Status: DISCONTINUED | OUTPATIENT
Start: 2024-11-24 | End: 2024-11-28 | Stop reason: HOSPADM

## 2024-11-24 RX ORDER — TRANEXAMIC ACID 10 MG/ML
1 INJECTION, SOLUTION INTRAVENOUS EVERY 30 MIN PRN
Status: DISCONTINUED | OUTPATIENT
Start: 2024-11-24 | End: 2024-11-28 | Stop reason: HOSPADM

## 2024-11-24 RX ORDER — OXYTOCIN/0.9 % SODIUM CHLORIDE 30/500 ML
340 PLASTIC BAG, INJECTION (ML) INTRAVENOUS CONTINUOUS PRN
Status: DISCONTINUED | OUTPATIENT
Start: 2024-11-24 | End: 2024-11-28 | Stop reason: HOSPADM

## 2024-11-24 RX ORDER — NALBUPHINE HYDROCHLORIDE 10 MG/ML
2.5-5 INJECTION INTRAMUSCULAR; INTRAVENOUS; SUBCUTANEOUS EVERY 6 HOURS PRN
Status: DISCONTINUED | OUTPATIENT
Start: 2024-11-24 | End: 2024-11-28 | Stop reason: HOSPADM

## 2024-11-24 RX ORDER — METHYLERGONOVINE MALEATE 0.2 MG/ML
200 INJECTION INTRAVENOUS
Status: DISCONTINUED | OUTPATIENT
Start: 2024-11-24 | End: 2024-11-28 | Stop reason: HOSPADM

## 2024-11-24 RX ORDER — HYDROCORTISONE 25 MG/G
CREAM TOPICAL 3 TIMES DAILY PRN
Status: DISCONTINUED | OUTPATIENT
Start: 2024-11-24 | End: 2024-11-28 | Stop reason: HOSPADM

## 2024-11-24 RX ORDER — OXYTOCIN 10 [USP'U]/ML
10 INJECTION, SOLUTION INTRAMUSCULAR; INTRAVENOUS
Status: DISCONTINUED | OUTPATIENT
Start: 2024-11-24 | End: 2024-11-28 | Stop reason: HOSPADM

## 2024-11-24 RX ORDER — MISOPROSTOL 200 UG/1
800 TABLET ORAL
Status: DISCONTINUED | OUTPATIENT
Start: 2024-11-24 | End: 2024-11-28 | Stop reason: HOSPADM

## 2024-11-24 RX ADMIN — SODIUM CHLORIDE, POTASSIUM CHLORIDE, SODIUM LACTATE AND CALCIUM CHLORIDE: 600; 310; 30; 20 INJECTION, SOLUTION INTRAVENOUS at 08:45

## 2024-11-24 RX ADMIN — MISOPROSTOL 800 MCG: 200 TABLET ORAL at 13:10

## 2024-11-24 RX ADMIN — MISOPROSTOL 25 MCG: 100 TABLET ORAL at 03:51

## 2024-11-24 RX ADMIN — MISOPROSTOL 25 MCG: 100 TABLET ORAL at 06:17

## 2024-11-24 RX ADMIN — IBUPROFEN 800 MG: 800 TABLET, FILM COATED ORAL at 20:12

## 2024-11-24 RX ADMIN — BUPIVACAINE HYDROCHLORIDE 10 ML: 2.5 INJECTION, SOLUTION EPIDURAL; INFILTRATION; INTRACAUDAL at 11:01

## 2024-11-24 RX ADMIN — Medication 2 MILLI-UNITS/MIN: at 08:46

## 2024-11-24 RX ADMIN — LEVOTHYROXINE SODIUM 50 MCG: 0.05 TABLET ORAL at 07:47

## 2024-11-24 RX ADMIN — MISOPROSTOL 25 MCG: 100 TABLET ORAL at 01:52

## 2024-11-24 RX ADMIN — IBUPROFEN 800 MG: 800 TABLET, FILM COATED ORAL at 13:41

## 2024-11-24 RX ADMIN — ACETAMINOPHEN 650 MG: 325 TABLET, FILM COATED ORAL at 23:10

## 2024-11-24 RX ADMIN — Medication: at 11:14

## 2024-11-24 NOTE — L&D DELIVERY NOTE
Cherie Israel is a 34 year old  who was admitted at 38w4d for CR/IOL for preE without SF .  Pregnancy was complicated by preE without SF (Bps during labor were normal to mild range), h/o VAVD x1, h/o PPH, IVF pregnancy, O pos, GBS neg.  She underwent ripening with cytotec, then pitocin and AROM.  AROM occurred notable for clear fluid. She progressed to fully and began pushing effectively. Pelvis was noted to be adequate.  She delivered a viable female infant with APGARs of 9 and 9, respectively.  Head delivered in an LAURA position, restituted to ROT and delivered without difficulty.  Left anterior shoulder delivered first, followed by right posterior shoulder without complication, and then rest of body. Spontaneous delivery of a placenta with a 3-V cord ensued shortly thereafter.  Placenta was examined and noted to be intact. She received IV pitocin and cytotec SC as a uterotonic agent.  Exam of the perineum revealed a 2nd degree laceration, which was repaired in standard fashion using a 3-0 monocryl suture.  QBL 168cc.      Gregoria Muñiz MD   2024, 1:27 PM     Delivery Summary    Cherie Israel MRN# [id number]   Age: 34 year old YOB: 1990          Siddharth Israel-Cherie [[id number]]      Labor Event Times      Latent labor onset date/time: 202452    Active labor onset date: 24 Onset time: 12:40 PM   Dilation complete date: 24 Complete time: 12:40 PM   Start pushing date/time: 2024 1248          Labor Events     labor?: No   steroids: None       Rupture date/time: 24 0852   Rupture type: Artificial Rupture of Membranes  Fluid color: Clear     Induction: Misoprostol, Oxytocin, AROM  Induction date/time:      Cervical ripening date/time:      Indications for induction: Mild Preeclampsia     Augmentation: Oxytocin       Delivery/Placenta Date and Time      Delivery Date: 24 Delivery Time:  1:02 PM   Placenta Date/Time:  2024  1:07 PM  Oxytocin given at the time of delivery: after delivery of baby  Delivering clinician: Gregoria Muñiz MD   Other personnel present at delivery:  Provider Role   Benjamin Fontanez RN Clarens, Johanna, RN              Vaginal Counts       Initial count performed by 2 team members:  Two Team Members   Dr. Muñiz         Needles Suture Needles Sponges (RETIRED) Instruments   Initial counts 2  5    Added to count  1     Relief counts       Final counts 2 1 5            Placed during labor Accounted for at the end of labor   FSE No NA   IUPC No NA   Cervidil No NA                  Final count performed by 2 team members:  Two Team Members   Dr. Antonino Abad RN      Final count correct?: Yes  Pre-Birth Team Brief: Complete  Post-Birth Team Debrief: Complete       Apgars    Living status: Living   1 Minute 5 Minute 10 Minute 15 Minute 20 Minute   Skin color: 1  1       Heart rate: 2  2       Reflex irritability: 2  2       Muscle tone: 2  2       Respiratory effort: 2  2       Total: 9  9       Apgars assigned by: BENJAMIN PADILLA RN       Cord      Vessels: 3 Vessels    Cord Complications: None               Cord Blood Disposition: Lab    Gases Sent?: No    Stem cell collection?: No           [place] Resuscitation    Methods: None       Skin to Skin and Feeding Plan      Skin to skin initiation date/time: 1841    Skin to skin with: Mother  Skin to skin end date/time:            Labor Events and Shoulder Dystocia    Fetal Tracing Prior to Delivery: Category 1  Shoulder dystocia present?: Neg       Delivery (Maternal) (Provider to Complete) ([id number])    Episiotomy: None  Perineal lacerations: 2nd Repaired?: Yes   Repair suture: 3-0 Monocryl  Genital tract inspection done: Pos       Blood Loss  Mother: Cherie Israel #[id number]     Start of Mother's Information      Delivery Blood Loss   Intrapartum & Postpartum: 24 1240 - 24 1327    Delivery Admission: 24 - 24 1327          Intrapartum & Postpartum Delivery Admission    Delivery QBL (mL) Hospital Encounter 168 mL 168 mL    Total  168 mL 168 mL               End of Mother's Information  Mother: Cherie Israel #[id number]                Delivery - Provider to Complete ([id number])    Delivering clinician: Gregoria Muñiz MD  Delivery Type (Choose the 1 that will go to the Birth History): Vaginal, Spontaneous                         Other personnel:  Provider Role   Manju Fontanez, CARLYLE    ClarAbril sarabia RN                     Placenta    Date/Time: 11/24/2024  1:07 PM  Removal: Spontaneous  Disposition: Hospital disposal             Anesthesia    Method: Epidural                    Presentation and Position    Presentation: Vertex    Position: Right Occiput Anterior                     Gregoria Muñiz MD

## 2024-11-24 NOTE — PROVIDER NOTIFICATION
11/24/24 0716   Provider Notification   Provider Name/Title Dr. Muñiz   Method of Notification Phone   Notification Reason Status Update  (updated of pt contrx q1-4mins, coupling and tripling, pt is talking through yet is aware of contrx, palpate mild, fht's catagory 1. ok to check cervix and move on to pitocin, sve 2.5/60/-2, plan to arom. ok for pt to be disconnected and eat and shower.)

## 2024-11-24 NOTE — PROVIDER NOTIFICATION
11/24/24 0849   Provider Notification   Provider Name/Title Dr. Muñiz   Method of Notification At Bedside   Notification Reason Status Update  (sve 3cm, AROM of clear fluid)

## 2024-11-24 NOTE — PROGRESS NOTES
Data: Cherie Israel transferred to 426 via wheelchair at 1515. Baby transferred via parent's arms.  Action: Receiving unit notified of transfer: Yes. Patient and family notified of room change. Report given to CARLYLE Diaz at 1515. Belongings sent to receiving unit. Accompanied by Registered Nurse. Oriented patient to surroundings. Call light within reach. ID bands double-checked with receiving RN.  Response: Patient tolerated transfer and is stable.

## 2024-11-24 NOTE — PLAN OF CARE
"Patient oriented to postpartum room, call light system, and plan of care. Vital signs stable. Denies headache, vision changes, epigastric pain, nausea. Fundus is firm and midline, scant lochia. Patient unable to void, straight cath completed at 1900. Ambulating independently, denies dizziness. Denies pain. Breastfeeding every 2-3 hours, latch observed. Attentive to  cues.    Problem: Adult Inpatient Plan of Care  Goal: Plan of Care Review  Description: The Plan of Care Review/Shift note should be completed every shift.  The Outcome Evaluation is a brief statement about your assessment that the patient is improving, declining, or no change.  This information will be displayed automatically on your shift  note.  Outcome: Progressing  Flowsheets (Taken 2024 1728)  Plan of Care Reviewed With:   patient   spouse  Overall Patient Progress: improving  Goal: Patient-Specific Goal (Individualized)  Description: You can add care plan individualizations to a care plan. Examples of Individualization might be:  \"Parent requests to be called daily at 9am for status\", \"I have a hard time hearing out of my right ear\", or \"Do not touch me to wake me up as it startles  me\".  Outcome: Progressing  Goal: Absence of Hospital-Acquired Illness or Injury  Outcome: Progressing  Intervention: Prevent Skin Injury  Recent Flowsheet Documentation  Taken 2024 1530 by Emily Albright, RN  Body Position: position changed independently  Goal: Optimal Comfort and Wellbeing  Outcome: Progressing  Intervention: Provide Person-Centered Care  Recent Flowsheet Documentation  Taken 2024 1530 by Emily Albright, RN  Trust Relationship/Rapport:   care explained   choices provided   questions answered   questions encouraged   reassurance provided  Goal: Readiness for Transition of Care  Outcome: Progressing     Problem: Hypertensive Disorders in Pregnancy  Goal: Patient-Fetal Stabilization  Outcome: Progressing     Problem: Fall Injury " Risk  Goal: Absence of Fall and Fall-Related Injury  Outcome: Progressing     Problem: Postpartum (Vaginal Delivery)  Goal: Successful Parent Role Transition  Outcome: Progressing  Intervention: Support Parent Role Transition  Recent Flowsheet Documentation  Taken 11/24/2024 1530 by Emily Albright RN  Supportive Measures:   active listening utilized   decision-making supported   positive reinforcement provided   self-reflection promoted  Parent-Child Attachment Promotion:   caring behavior modeled   cue recognition promoted   interaction encouraged   parent/caregiver presence encouraged   participation in care promoted   positive reinforcement provided   rooming-in promoted  Goal: Hemostasis  Outcome: Progressing  Goal: Absence of Infection Signs and Symptoms  Outcome: Progressing  Goal: Anesthesia/Sedation Recovery  Outcome: Progressing  Goal: Optimal Pain Control and Function  Outcome: Progressing  Goal: Effective Urinary Elimination  Outcome: Progressing   Goal Outcome Evaluation:      Plan of Care Reviewed With: patient, spouse    Overall Patient Progress: improvingOverall Patient Progress: improving

## 2024-11-24 NOTE — PROGRESS NOTES
Patient doing well; she is starting to feel contractions more.  She is amenable to AROM.    FHT: 155/mod/+accel/-decel  [place]: q 4 mins  SVE: 3/60/-2, cephalic  AROM'ed with clear fluid. Head well applied to cervix with no cord prolapse.      A&P: 35yo  at 38+4 wga, CR/IOL for preE without SF  - CR/IOL: s/p cytotec PO x5 doses.  Pitocin started, and s/p uncomplicated AROM   - Epidural upon request  - PreE without SF:    - Bps mostly mild range; 1 isolated severe range   - No indication for IV antihypertensives of mag   - CBC/CMP normal on admit.  - h/o  x1 (VAVD for maternal exhaustion), pelvis proven to 7# 7oz  - h/o PPH  - IVF pregnancy  - O pos  - GBS neg  - Postpartum Contraception: if CS, desired BS.  Otherwise, vasectomy  - Dispo: Anticipate

## 2024-11-24 NOTE — PROVIDER NOTIFICATION
24   Provider Notification   Provider Name/Title Dr. Cm   Method of Notification At Bedside   Request Evaluate in Person   Notification Reason Patient Arrived     MD at bedside to discuss POC with patient. , 38.3, GBS-. Hx of PP hemorrhage, vacuum assisted delivery, IVF pregnancy, AMA, and hypothyroidism. Ripening with PO cytotec for pre-eclampsia. Admission /83 and labs sent. Assessment WNL and pt denies pre-e symptoms. Occasional cxs on toco and palpate mild. Patient describes feeling intermittent tightening/pressure. Denies LOF/bleeding/pain at this time. Category 1 FHR tracing. Patient questions answered.     Per MD, check patient and send [employer] messge with results. Continue with PO cytotec. Plan for epidural for pain management.

## 2024-11-24 NOTE — PLAN OF CARE
Data: [facility]: 2024  8:29 PM.  Patient admitted for induction for pre-eclampsia. Patient is a .  Prenatal record reviewed. Pregnancy  has been complicated by pre-eclampsia, IVF, AMA, and history of PP hemorrhage.  Gestational Age 38w3d. VSS. Fetal movement active. Patient denies uterine contractions, leaking of vaginal fluid/rupture of membranes, vaginal bleeding, abdominal pain, nausea, vomiting, headache, visual disturbances, epigastric or URQ pain, significant edema. Support person is present.   Action: Verbal consent for EFM.  Admission assessment completed. Bill of rights reviewed.  Response: Patient verbalized agreement with plan. Will contact Dr. Toi de la rosa with update and further orders.

## 2024-11-24 NOTE — PROVIDER NOTIFICATION
11/24/24 1153   Provider Notification   Provider Name/Title Dr. Muñiz   Notification Reason Status Update;SVE  (updated of pt beign comfortable with an epidural, sve 4+/80/-1, contrx q2-4min, fhts mod ray/+accels/int early/variable, pit @8mu, ga in place.)

## 2024-11-24 NOTE — PROVIDER NOTIFICATION
11/24/24 1239   Provider Notification   Provider Name/Title Dr. Muñiz   Request Attend Delivery   Notification Reason SVE  (complete/zero station)

## 2024-11-24 NOTE — PLAN OF CARE
"Goal Outcome Evaluation:  Plan of Care Reviewed With: patient, spouse  Overall Patient Progress: improvingOverall Patient Progress: improving  Patient able to rest well all night. Received 5 doses of PO miso. FHR tracing cat 1 all night long. Contractions irregular, patient describing them as mild cramping. VSS. BP taken at 0300am was initially elevated but patient was shaking from cold when taken. Recheck 15min later was normal, not elevated. Patient denies all s/s of pre-e.       Problem: Adult Inpatient Plan of Care  Goal: Plan of Care Review  Description: The Plan of Care Review/Shift note should be completed every shift.  The Outcome Evaluation is a brief statement about your assessment that the patient is improving, declining, or no change.  This information will be displayed automatically on your shift  note.  Outcome: Progressing  Flowsheets (Taken 11/24/2024 0659)  Plan of Care Reviewed With:   patient   spouse  Overall Patient Progress: improving  Goal: Patient-Specific Goal (Individualized)  Description: You can add care plan individualizations to a care plan. Examples of Individualization might be:  \"Parent requests to be called daily at 9am for status\", \"I have a hard time hearing out of my right ear\", or \"Do not touch me to wake me up as it startles  me\".  Outcome: Progressing  Goal: Absence of Hospital-Acquired Illness or Injury  Outcome: Progressing  Goal: Optimal Comfort and Wellbeing  Outcome: Progressing  Goal: Readiness for Transition of Care  Outcome: Progressing     Problem: Labor  Goal: Hemostasis  Outcome: Progressing  Goal: Stable Fetal Wellbeing  Outcome: Progressing  Goal: Effective Progression to Delivery  Outcome: Progressing  Goal: Absence of Infection Signs and Symptoms  Outcome: Progressing  Goal: Acceptable Pain Control  Outcome: Progressing  Goal: Normal Uterine Contraction Pattern  Outcome: Progressing            "

## 2024-11-24 NOTE — ANESTHESIA PROCEDURE NOTES
Dural puncture epidural Procedure Note    Pre-Procedure   Staff -        Anesthesiologist:  Teena Segal MD       Performed By: anesthesiologist       Location: OB       Procedure Start/Stop Times: 11/24/2024 11:01 AM and 11/24/2024 11:11 AM       Pre-Anesthestic Checklist: patient identified, IV checked, risks and benefits discussed, informed consent, monitors and equipment checked, pre-op evaluation and at physician/surgeon's request  Timeout:       Correct Patient: Yes        Correct Procedure: Yes        Correct Site: Yes        Correct Position: Yes   Procedure Documentation  Procedure: dural puncture epidural       Patient Position: sitting       Skin prep: Chloraprep       Local skin infiltrated with 2 mL of 2% lidocaine.        Insertion Site: L3-4. (midline approach).       Technique: LORT air        ZULLY at 6 cm.       Needle Type: Touhy       Needle Gauge: 17.        Needle Length (Inches): 3.5        Spinal Needle Type: Pencan       Spinal Needle (gauge): 25        Spinal Needle Length (inches): 5       Catheter: 19 G.          Catheter threaded easily.         5 cm epidural space.         Threaded 11 cm at skin.         # of attempts: 1 and  # of redirects:  0    Assessment/Narrative         Paresthesias: No.       Test dose of 3 mL lidocaine 1.5% w/ 1:200,000 epinephrine at.         Test dose negative, 3 minutes after injection, for signs of intravascular, subdural, or intrathecal injection.       Insertion/Infusion Method: LORT air       Aspiration negative for Heme or CSF via Epidural Catheter.       Sensory Level Left: T10.       Sensory Level Right: T10.       CSF fluid: with Spinal needle.CSF fluid removed: with Epidural needle - not with Epidural needle.    Medication(s) Administered   0.25% Bupivacaine PF (Intrathecal) - EPIDURAL   10 mL - 11/24/2024 11:01:00 AM  Medication Administration Time: 11/24/2024 11:01 AM      FOR [facility] ([facility] ONLY:   Pain Team Contact information: please  "page the Pain Team Via [facility]. Search \"Pain\". During daytime hours, please page the attending first. At night please page the resident first.      "

## 2024-11-24 NOTE — DISCHARGE SUMMARY
[facility] Discharge Summary    Cherie Israel MRN# [id number]   Age: 34 year old YOB: 1990     Date of Admission:  2024  Date of Discharge::  2024  Admitting Physician:  Noemi Cm MD  Discharge Physician:  Lilliam Ruth MD           Admission Diagnoses:   - IUP at 38w4d  - IOL for preE without SF  - IVF pregnancy  - h/o VAVD, h/o PPH          Discharge Diagnosis:     -IUP at 38w4d, now delivered          Procedures:     Procedure(s): -            Medications Prior to Admission:     Medications Prior to Admission   Medication Sig Dispense Refill Last Dose/Taking    aspirin (ASA) 81 MG chewable tablet Take 81 mg by mouth daily.   Taking    levothyroxine (SYNTHROID/LEVOTHROID) 50 MCG tablet Take 50 mcg by mouth daily.   Taking    Vitamin D, Cholecalciferol, 10 MCG (400 UNIT) CHEW Take by mouth.   Taking    Prenatal Vit-Fe Fumarate-FA (PRENATAL MULTIVITAMIN W/IRON) 27-0.8 MG tablet Take 1 tablet by mouth daily                Discharge Medications:     Current Discharge Medication List        START taking these medications    Details   acetaminophen (TYLENOL) 325 MG tablet Take 2 tablets (650 mg) by mouth every 4 hours as needed for mild pain or fever.  Qty: 60 tablet, Refills: 0    Associated Diagnoses:  (spontaneous vaginal delivery); Preeclampsia, third trimester      docusate sodium (COLACE) 100 MG capsule Take 1 capsule (100 mg) by mouth 2 times daily as needed for constipation.  Qty: 20 capsule, Refills: 0    Associated Diagnoses:  (spontaneous vaginal delivery); Preeclampsia, third trimester      ibuprofen (ADVIL/MOTRIN) 800 MG tablet Take 1 tablet (800 mg) by mouth every 6 hours as needed for other (cramping).  Qty: 60 tablet, Refills: 1    Associated Diagnoses:  (spontaneous vaginal delivery); Preeclampsia, third trimester      labetalol (NORMODYNE) 200 MG tablet Take 1 tablet (200 mg) by mouth every 8 hours.  Qty: 90 tablet, Refills: 0    Associated  Diagnoses: Preeclampsia, third trimester      NIFEdipine ER OSMOTIC (ADALAT CC) 60 MG 24 hr tablet Take 1 tablet (60 mg) by mouth every 12 hours.  Qty: 60 tablet, Refills: 0    Associated Diagnoses: Preeclampsia, third trimester           CONTINUE these medications which have NOT CHANGED    Details   levothyroxine (SYNTHROID/LEVOTHROID) 50 MCG tablet Take 50 mcg by mouth daily.      Vitamin D, Cholecalciferol, 10 MCG (400 UNIT) CHEW Take by mouth.      Prenatal Vit-Fe Fumarate-FA (PRENATAL MULTIVITAMIN W/IRON) 27-0.8 MG tablet Take 1 tablet by mouth daily           STOP taking these medications       aspirin (ASA) 81 MG chewable tablet Comments:   Reason for Stopping:                  Brief Admission History   Cherie Israel is a 34 year old  at 38+3wga presenting for CR/IOL for preeclampsia without severe features. Denies CXT, LOF, VB. Reports good FM. Denies HA, vision changes, CP, SOB.     Cherie Israel is a 34 year old  at 38+3wga admitted for CR/IOL for preeclampsia without severe features.     IOL  - Cervical ripening with oral Cytotec overnight, then Pitocin/AROM for induction of labor once ripened  - Pain control per patient request. General diet, up ad lissy.   - Fetus vertex on  ultrasound  - 10/25 growth ultrasound: EFW 2359 g, 40th percentile, anterior placenta  - GBS negative, antibiotics not indicated  - Continuous external fetal monitoring     Pre-E without SF  - Based on elevated blood pressures greater than 4 hours apart and UPC of 0.49  - Baseline labs WNL - repeat  wnl  - Admission HELLP labs ordered  - Not currently on antihypertensives  - No active sx of preE     Prenatal Care:  - O positive, Rubella immune  - Genetics: NIPS and AFP neg  - Anatomy ultrasound: normal level 2 US  - S/p Tdap and flu   - GBS neg  - Contraception: if  then vasectomy, if c/s then tubal (FTP signed )     H/o PPH  - Atony and complex sulcal lacerations, consider TXA at time of  delivery     H/o VAVD  - 2/2 maternal exhaustion      Hypothyroidism  - Currently managed on Synthroid 50mcg  - Follows with Endo; last visit 23  - Last TSH 0.71 on ,10/11 TSH 0.61     IVF pregnancy         Brief Intrapartum Course:   Cherie Israel is a 34 year old  who was admitted at 38w4d for CR/IOL for preE without SF .  Pregnancy was complicated by preE without SF (Bps during labor were normal to mild range), h/o VAVD x1, h/o PPH, IVF pregnancy, O pos, GBS neg.  She underwent ripening with cytotec, then pitocin and AROM.  AROM occurred notable for clear fluid. She progressed to fully and began pushing effectively. Pelvis was noted to be adequate.  She delivered a viable female infant with APGARs of 9 and 9, respectively.  Head delivered in an LAURA position, restituted to ROT and delivered without difficulty.  Left anterior shoulder delivered first, followed by right posterior shoulder without complication, and then rest of body. Spontaneous delivery of a placenta with a 3-V cord ensued shortly thereafter.  Placenta was examined and noted to be intact. She received IV pitocin and cytotec NJ as a uterotonic agent.  Exam of the perineum revealed a 2nd degree laceration, which was repaired in standard fashion using a 3-0 monocryl suture.  QBL 168cc.             Hospital Course:   The patient's hospital course was unremarkable.  Her blood pressure regimen was titrated and at time of discharge she was on Nifedipine 60mg q12hrs and Labetalol 200mg q8hrs.     On discharge, her pain was well controlled. Vaginal bleeding is similar to peak menstrual flow.  Voiding without difficulty.  Ambulating well and tolerating a normal diet.  No fever.  Breastfeeding initiated and going well.  Infant is stable. She was discharged on post-partum day #4.    Post-partum hemoglobin:   Hemoglobin   Date Value Ref Range Status   2024 12.9 11.7 - 15.7 g/dL Final             Discharge Instructions and Follow-Up:      Discharge diet: Regular   Discharge activity: Pelvic rest for 6 weeks including no sexual intercourse, tampons, or douching.   Discharge follow-up: Follow up with your primary OB for a routine postpartum visit in 6 weeks           Discharge Disposition:     Discharged to home      Lauren MacNeill, MD [facility]  [phone]  11/28/2024 9:24 AM

## 2024-11-24 NOTE — ANESTHESIA PREPROCEDURE EVALUATION
"Anesthesia Pre-Procedure Evaluation    Patient: Cherie Israel   MRN: [id number] : 1990        Procedure : * No procedures listed *          Past Medical History:   Diagnosis Date    Pre-eclampsia     Thyroid disease       Past Surgical History:   Procedure Laterality Date    EYE SURGERY        No Known Allergies   Social History     Tobacco Use    Smoking status: Never    Smokeless tobacco: Never   Substance Use Topics    Alcohol use: Not Currently      Wt Readings from Last 1 Encounters:   22 85 kg (187 lb 6.4 oz)        Anesthesia Evaluation            ROS/MED HX  ENT/Pulmonary:       Neurologic:       Cardiovascular:     (+)  - - PIH  -  - -                                      METS/Exercise Tolerance:     Hematologic:       Musculoskeletal:       GI/Hepatic:       Renal/Genitourinary:       Endo:       Psychiatric/Substance Use:       Infectious Disease:       Malignancy:       Other:            Physical Exam    Airway        Mallampati: II   TM distance: > 3 FB   Neck ROM: full   Mouth opening: > 3 cm    Respiratory Devices and Support         Dental           Cardiovascular   cardiovascular exam normal          Pulmonary   pulmonary exam normal                OUTSIDE LABS:  CBC:   Lab Results   Component Value Date    WBC 12.3 (H) 2024    WBC 20.6 (H) 2022    HGB 12.9 2024    HGB 9.4 (L) 2022    HCT 38.6 2024    HCT 28.7 (L) 2022     2024     2022     BMP:   Lab Results   Component Value Date     (L) 2024     2022    POTASSIUM 4.0 2024    POTASSIUM 4.4 2022    CHLORIDE 102 2024    CHLORIDE 106 2022    CO2 17 (L) 2024    CO2 28 2022    BUN 16.8 2024    BUN 12 2022    CR 0.90 2024    CR 0.92 2022     (H) 2024     (H) 2022     COAGS: No results found for: \"PTT\", \"INR\", \"FIBR\"  POC: No results found for: \"BGM\", \"HCG\", " "\"HCGS\"  HEPATIC:   Lab Results   Component Value Date    ALBUMIN 3.4 (L) 11/23/2024    PROTTOTAL 6.5 11/23/2024    ALT 19 11/23/2024    AST 26 11/23/2024    ALKPHOS 460 (H) 11/23/2024    BILITOTAL <0.2 11/23/2024     OTHER:   Lab Results   Component Value Date    DEISY 8.5 (L) 11/23/2024       Anesthesia Plan    ASA Status:  2       Anesthesia Type: Epidural.              Consents    Anesthesia Plan(s) and associated risks, benefits, and realistic alternatives discussed. Questions answered and patient/representative(s) expressed understanding.     - Discussed:     - Discussed with:  Patient            Postoperative Care            Comments:               Teena Segal MD    I have reviewed the pertinent notes and labs in the chart from the past 30 days and (re)examined the patient.  Any updates or changes from those notes are reflected in this note.     # Hyponatremia: Lowest Na = 132 mmol/L in last 2 days, will monitor as appropriate   # Hypocalcemia: Lowest Ca = 8.5 mg/dL in last 2 days, will monitor and replace as appropriate     # Hypoalbuminemia: Lowest albumin = 3.4 g/dL at 11/23/2024  8:58 PM, will monitor as appropriate   # Drug Induced Platelet Defect: home medication list includes an antiplatelet medication   # Hypertension: Noted on problem list                     "

## 2024-11-24 NOTE — H&P
[facility]   Labor & Delivery H&P    Cherie Dorado YOB: 1990   MRN [id number] Primary OB: [facility]     HPI   Cherie Dorado is a 34 year old  at 38+3wga presenting for CR/IOL for preeclampsia without severe features. Denies CXT, LOF, VB. Reports good FM. Denies HA, vision changes, CP, SOB.      PREGNANCY HISTORY   OB PROBLEM LIST  Preeclampsia without severe features  IVF pregnancy  History of postpartum hemorrhage  History of vacuum-assisted vaginal delivery  Hypothyroidism    OB History    Para Term  AB Living   2 1 1 0 0 1   SAB IAB Ectopic Multiple Live Births   0 0 0 0 1      # Outcome Date GA Lbr Artem/2nd Weight Sex Type Anes PTL Lv   2 Current            1 Term 22 40w6d 02:45 / 04:13 3.375 kg (7 lb 7.1 oz) M Vag-Vacuum EPI N ROLDAN      Name: ATNHONY DORADO-CHERIE      Apgar1: 8  Apgar5: 9     2024 ultrasound: Fetus vertex, anterior placenta, normal KAIT    MATERNAL MEDICAL HISTORY     Past Medical History:   Diagnosis Date    Pre-eclampsia     Thyroid disease      Past Surgical History:   Procedure Laterality Date    EYE SURGERY       History reviewed. No pertinent family history.  Social History     Tobacco Use    Smoking status: Never    Smokeless tobacco: Never   Substance Use Topics    Alcohol use: Not Currently     Medications Prior to Admission   Medication Sig Dispense Refill Last Dose/Taking    aspirin (ASA) 81 MG chewable tablet Take 81 mg by mouth daily.   Taking    Vitamin D, Cholecalciferol, 10 MCG (400 UNIT) CHEW Take by mouth.   Taking    Prenatal Vit-Fe Fumarate-FA (PRENATAL MULTIVITAMIN W/IRON) 27-0.8 MG tablet Take 1 tablet by mouth daily        No Known Allergies   OBJECTIVE     Vitals:    24 2100   BP: (!) 143/83   BP Location: Right arm   Patient Position: Semi-Myers's   Cuff Size: Adult Large   Resp: 16   TempSrc: Oral       Physical Exam  General: Alert, in no acute distress, resting comfortably in bed.    Neuro: Grossly normal to observation.  Psych: Alert, oriented, affect appropriate.  Cardiovascular: Normal rate, wwp.   Respiratory: Nonlabored breathing, equal chest rise/fall bilaterally.   Abdomen: Gravid, nontender.   Skin: Color, texture, turgor normal. No concerning rashes or lesions.    SVE: 1.5/70/-2  Membranes: intact    Fetal Monitoring  FHT: baseline 135, moderate variability, 15x15 accels, no decels  Volant: Irregular Q3-10min    ASSESSMENT & PLAN   Cherie Israel is a 34 year old  at 38+3wga admitted for CR/IOL for preeclampsia without severe features.    IOL  - Cervical ripening with oral Cytotec overnight, then Pitocin/AROM for induction of labor once ripened  - Pain control per patient request. General diet, up ad lissy.   - Fetus vertex on  ultrasound  - 10/25 growth ultrasound: EFW 2359 g, 40th percentile, anterior placenta  - GBS negative, antibiotics not indicated  - Continuous external fetal monitoring    Pre-E without SF  - Based on elevated blood pressures greater than 4 hours apart and UPC of 0.49  - Baseline labs WNL - repeat  wnl  - Admission HELLP labs ordered  - Not currently on antihypertensives  - No active sx of preE    Prenatal Care:  - O positive, Rubella immune  - Genetics: NIPS and AFP neg  - Anatomy ultrasound: normal level 2 US  - S/p Tdap and flu   - GBS neg  - Contraception: if  then vasectomy, if c/s then tubal (FTP signed )     H/o PPH  - Atony and complex sulcal lacerations, consider TXA at time of delivery     H/o VAVD  - 2/2 maternal exhaustion      Hypothyroidism  - Currently managed on Synthroid 50mcg  - Follows with Endo; last visit 23  - Last TSH 0.71 on ,10/11 TSH 0.61    IVF pregnancy      Brooke Zaiz, MD [facility]  2024 9:27 PM

## 2024-11-24 NOTE — PROVIDER NOTIFICATION
11/23/24 2215   Provider Notification   Provider Name/Title Dr. Cm   Method of Notification Electronic Page   Request Evaluate - Remote   Notification Reason SVE     Patient's SVE is 1.5/70/-2. Esparza of 6. Cxing irregularly q 3-10 minutes and palpate mild. Asking for order of 50mg PO atarax for patient as well.     MD aware of cervical check and okay with order for atarax.

## 2024-11-25 LAB — HGB BLD-MCNC: 12.9 G/DL (ref 11.7–15.7)

## 2024-11-25 PROCEDURE — 250N000013 HC RX MED GY IP 250 OP 250 PS 637: Performed by: OBSTETRICS & GYNECOLOGY

## 2024-11-25 PROCEDURE — 250N000013 HC RX MED GY IP 250 OP 250 PS 637: Performed by: STUDENT IN AN ORGANIZED HEALTH CARE EDUCATION/TRAINING PROGRAM

## 2024-11-25 PROCEDURE — 999N000080 HC STATISTIC IP LACTATION SERVICES 16-30 MIN

## 2024-11-25 PROCEDURE — 85018 HEMOGLOBIN: CPT | Performed by: OBSTETRICS & GYNECOLOGY

## 2024-11-25 PROCEDURE — 36415 COLL VENOUS BLD VENIPUNCTURE: CPT | Performed by: OBSTETRICS & GYNECOLOGY

## 2024-11-25 PROCEDURE — 120N000001 HC R&B MED SURG/OB

## 2024-11-25 RX ORDER — NIFEDIPINE 30 MG/1
30 TABLET, EXTENDED RELEASE ORAL ONCE
Status: COMPLETED | OUTPATIENT
Start: 2024-11-25 | End: 2024-11-25

## 2024-11-25 RX ORDER — NIFEDIPINE 30 MG/1
60 TABLET, EXTENDED RELEASE ORAL DAILY
Status: DISCONTINUED | OUTPATIENT
Start: 2024-11-26 | End: 2024-11-26

## 2024-11-25 RX ORDER — FUROSEMIDE 20 MG/1
20 TABLET ORAL ONCE
Status: COMPLETED | OUTPATIENT
Start: 2024-11-25 | End: 2024-11-25

## 2024-11-25 RX ORDER — NIFEDIPINE 30 MG/1
30 TABLET, EXTENDED RELEASE ORAL DAILY
Status: DISCONTINUED | OUTPATIENT
Start: 2024-11-25 | End: 2024-11-25

## 2024-11-25 RX ADMIN — ACETAMINOPHEN 650 MG: 325 TABLET, FILM COATED ORAL at 11:15

## 2024-11-25 RX ADMIN — NIFEDIPINE 30 MG: 30 TABLET, FILM COATED, EXTENDED RELEASE ORAL at 09:52

## 2024-11-25 RX ADMIN — IBUPROFEN 800 MG: 800 TABLET, FILM COATED ORAL at 14:10

## 2024-11-25 RX ADMIN — LEVOTHYROXINE SODIUM 50 MCG: 0.05 TABLET ORAL at 07:21

## 2024-11-25 RX ADMIN — FUROSEMIDE 20 MG: 20 TABLET ORAL at 09:52

## 2024-11-25 RX ADMIN — NIFEDIPINE 30 MG: 30 TABLET, FILM COATED, EXTENDED RELEASE ORAL at 18:11

## 2024-11-25 RX ADMIN — IBUPROFEN 800 MG: 800 TABLET, FILM COATED ORAL at 20:18

## 2024-11-25 RX ADMIN — IBUPROFEN 800 MG: 800 TABLET, FILM COATED ORAL at 02:00

## 2024-11-25 RX ADMIN — IBUPROFEN 800 MG: 800 TABLET, FILM COATED ORAL at 07:58

## 2024-11-25 RX ADMIN — ACETAMINOPHEN 650 MG: 325 TABLET, FILM COATED ORAL at 04:14

## 2024-11-25 RX ADMIN — DOCUSATE SODIUM 100 MG: 100 CAPSULE, LIQUID FILLED ORAL at 07:58

## 2024-11-25 ASSESSMENT — ACTIVITIES OF DAILY LIVING (ADL)
ADLS_ACUITY_SCORE: 20
ADLS_ACUITY_SCORE: 0
ADLS_ACUITY_SCORE: 20
ADLS_ACUITY_SCORE: 0
ADLS_ACUITY_SCORE: 0
ADLS_ACUITY_SCORE: 20
ADLS_ACUITY_SCORE: 20
ADLS_ACUITY_SCORE: 0
ADLS_ACUITY_SCORE: 20
ADLS_ACUITY_SCORE: 0
ADLS_ACUITY_SCORE: 20
ADLS_ACUITY_SCORE: 0
ADLS_ACUITY_SCORE: 0
ADLS_ACUITY_SCORE: 20
ADLS_ACUITY_SCORE: 0
ADLS_ACUITY_SCORE: 20
ADLS_ACUITY_SCORE: 20
ADLS_ACUITY_SCORE: 0

## 2024-11-25 NOTE — PROGRESS NOTES
[facility]  PPD# 1    Pt doing well, states she has no HA, CP, SOB, RUQ or epigastric pain, scotomata, increased swelling. Ambulating, voiding, tolerating PO. Decreased lochia. Pain controlled. Breast feeding and coping well with infant.    Vitals:    24 2125 24 0147 24 0551 24 0930   BP: (!) 140/79 (!) 144/85 133/85 (!) 147/101   BP Location: Left arm Left arm Left arm Left arm   Patient Position: Semi-Myers's Fowlers Semi-Myers's Semi-Myers's   Cuff Size: Adult Regular Adult Regular Adult Regular Adult Regular   Pulse: 64 60 55 70   Resp:  16  14   Temp: 98.2  F (36.8  C) 97.9  F (36.6  C)  97.7  F (36.5  C)   TempSrc: Oral Oral  Oral   SpO2:         Abd soft, appropriately tender, nondistended, FFBU, no fundal tenderness  Ext 1+ edema bilat LE, no CT BL    Lab Results   Component Value Date    HGB 11.8 2024       A/P 34 year old  at 38w5d s/p  PPD# 1.        -Hemodynamically stable   -Rh pos  -Diet; regular  -pain Tylenol and Motrin  -Bowel ppx- Senna/docusate/simethicone  -Rubella immune  -Tdap given prenatally  -Breast feeding, encouraged. Continue PNV's, proper hydration and caloric intake couseled  -BC: considering vasectomy    Hypothyroidism  - Synthroid 50 mcg every day    Pre-E w/o SF (intrapartum)  - pre-E labs wnl except for Pr/Cr ratio  - overall mild range, will start Procardia 30 mg every day (2024)   - Lasix PO X 1 today  - BP q 2 hrs  - if severe range will repeat labs and start magnesium per protocol    -Plan; continue routine post-partum care.      Dr. Villa   [phone]

## 2024-11-25 NOTE — PROGRESS NOTES
Patient unavailable when Public Health Nurse (PHN) stopped by to discuss [facility] resources. Sleeping

## 2024-11-25 NOTE — LACTATION NOTE
Lactation in to visit with patient. Brenda needing to feed at time of visit. Baby brought to the breast STS. Latched to right breast in cross cradle. Writer pulled down on bottom lip to widen latch. Baby active-some swallows heard. Encouraged breast compressions. Reviewed normal course of lactation. Per bedside nurse stool transitioning. Discussed watching for cues to guide feeds, not going longer than 3 hours between feeds. Signs and symptoms of jaundice due to positive BRAD.    24 hour weight baby at 8% weight loss. Patient having breast changes and hearing lots of swallows now at beginning of feeds per bedside nurse and mother. Shown patient hand expression-encouraged to do after each feed to give back any volume back to baby. Discussed if weight loss increases or if any feeds non nutritive and not hearing swallows should start supplementing. Discussed plan with bedside nurse.

## 2024-11-25 NOTE — PROVIDER NOTIFICATION
11/25/24 0945   Provider Notification   Provider Name/Title Dr. Villa   Method of Notification Electronic Page   Request Evaluate-Remote   Notification Reason Vital Signs Change     /101. Denies headache, vision changes, epigastric pain, nausea. Order received to start Nifedipine 30 mg PO daily. Blood pressure monitoring Q2h.

## 2024-11-25 NOTE — PROGRESS NOTES
Bladder scanned patient at 1830, bladder scan showed >999. Straight cathed patient, got out 1200mL. It took 2 attempts to straight cath patient. Encouraged patient to drink fluids and attempt to try to void within 2 hours.

## 2024-11-25 NOTE — ANESTHESIA POSTPROCEDURE EVALUATION
Patient: Cherie Israel    Procedure: * No procedures listed *       Anesthesia Type:  Epidural    Note:  Disposition: Inpatient   Postop Pain Control: Uneventful            Sign Out: Well controlled pain   PONV: No   Neuro/Psych: Uneventful            Sign Out: Acceptable/Baseline neuro status   Airway/Respiratory: Uneventful            Sign Out: Acceptable/Baseline resp. status   CV/Hemodynamics: Uneventful            Sign Out: Acceptable CV status; No obvious hypovolemia; No obvious fluid overload   Other NRE: NONE   DID A NON-ROUTINE EVENT OCCUR? No    Event details/Postop Comments:  S/p epidural for labor.  I or my partner was immediately available for management of this patient during epidural infusion.  VSS. Doing well. Block resolved. Neuro at baseline. Denies positional headache. No apparent anesthetic complications. No follow up required.    Yesika Arthur MD            Last vitals:  Vitals:    11/24/24 2125 11/25/24 0147 11/25/24 0551   BP: (!) 140/79 (!) 144/85 133/85   Pulse: 64 60 55   Resp:  16    Temp: 98.2  F (36.8  C) 97.9  F (36.6  C)    SpO2:          Electronically Signed By: Yesika Arthur MD  November 25, 2024  6:57 AM

## 2024-11-25 NOTE — PLAN OF CARE
"Goal Outcome Evaluation:      Plan of Care Reviewed With: patient    Overall Patient Progress: improvingOverall Patient Progress: improving         Mild range BP's noted. Other VS-stable. PP checks WNL. No complaints of headache, dizziness, changes in vision, difficulty in breathing or RUQ pain. No hyperreflexia or clonus noted. Voiding without difficulty. Steady on feet. SL flushed, patent. Is breastfeeding and bonding well with baby.  Tylenol and ibuprofen for uterine cramping.  present at bedside.        Problem: Adult Inpatient Plan of Care  Goal: Plan of Care Review  Description: The Plan of Care Review/Shift note should be completed every shift.  The Outcome Evaluation is a brief statement about your assessment that the patient is improving, declining, or no change.  This information will be displayed automatically on your shift  note.  Outcome: Progressing  Flowsheets (Taken 11/25/2024 0443)  Plan of Care Reviewed With: patient  Overall Patient Progress: improving  Goal: Patient-Specific Goal (Individualized)  Description: You can add care plan individualizations to a care plan. Examples of Individualization might be:  \"Parent requests to be called daily at 9am for status\", \"I have a hard time hearing out of my right ear\", or \"Do not touch me to wake me up as it startles  me\".  Outcome: Progressing  Goal: Absence of Hospital-Acquired Illness or Injury  Outcome: Progressing  Intervention: Prevent Skin Injury  Recent Flowsheet Documentation  Taken 11/25/2024 0147 by Deedee Candelaria RN  Body Position: position changed independently  Intervention: Prevent Infection  Recent Flowsheet Documentation  Taken 11/25/2024 0147 by Deedee Candelaria RN  Infection Prevention: hand hygiene promoted  Goal: Optimal Comfort and Wellbeing  Outcome: Progressing  Intervention: Monitor Pain and Promote Comfort  Recent Flowsheet Documentation  Taken 11/25/2024 0200 by Deedee Candelaria RN  Pain Management " Interventions: medication (see MAR)  Taken 11/24/2024 2310 by Deedee Candelaria RN  Pain Management Interventions: medication (see MAR)  Intervention: Provide Person-Centered Care  Recent Flowsheet Documentation  Taken 11/25/2024 0147 by Deedee Candelaria RN  Trust Relationship/Rapport:   care explained   choices provided  Goal: Readiness for Transition of Care  Outcome: Progressing     Problem: Hypertensive Disorders in Pregnancy  Goal: Patient-Fetal Stabilization  Outcome: Progressing  Intervention: Optimize Blood Pressure and Fluid Status  Recent Flowsheet Documentation  Taken 11/25/2024 0147 by Deedee Candelaria RN  Fluid/Electrolyte Management: fluids provided     Problem: Fall Injury Risk  Goal: Absence of Fall and Fall-Related Injury  Outcome: Progressing     Problem: Postpartum (Vaginal Delivery)  Goal: Successful Parent Role Transition  Outcome: Progressing  Intervention: Support Parent Role Transition  Recent Flowsheet Documentation  Taken 11/25/2024 0147 by Deedee Candelaria RN  Supportive Measures: active listening utilized  Parent-Child Attachment Promotion: caring behavior modeled  Goal: Hemostasis  Outcome: Progressing  Goal: Absence of Infection Signs and Symptoms  Outcome: Progressing  Intervention: Prevent or Manage Infection  Recent Flowsheet Documentation  Taken 11/25/2024 0147 by Deedee Candelaria RN  Infection Management: aseptic technique maintained  Goal: Anesthesia/Sedation Recovery  Outcome: Progressing  Goal: Optimal Pain Control and Function  Outcome: Progressing  Intervention: Prevent or Manage Pain  Recent Flowsheet Documentation  Taken 11/25/2024 0200 by Deedee Candelaria RN  Pain Management Interventions: medication (see MAR)  Taken 11/25/2024 0147 by Deedee Candelaria RN  Perineal Care:   perineal hygiene encouraged   perineal spray bottle/warm water use encouraged  Taken 11/24/2024 2310 by Deedee Candelaria RN  Pain Management Interventions: medication (see  MAR)  Goal: Effective Urinary Elimination  Outcome: Progressing

## 2024-11-26 PROCEDURE — 250N000013 HC RX MED GY IP 250 OP 250 PS 637: Performed by: STUDENT IN AN ORGANIZED HEALTH CARE EDUCATION/TRAINING PROGRAM

## 2024-11-26 PROCEDURE — 120N000001 HC R&B MED SURG/OB

## 2024-11-26 PROCEDURE — 250N000013 HC RX MED GY IP 250 OP 250 PS 637: Performed by: OBSTETRICS & GYNECOLOGY

## 2024-11-26 RX ORDER — DOCUSATE SODIUM 100 MG/1
100 CAPSULE, LIQUID FILLED ORAL 2 TIMES DAILY PRN
Qty: 20 CAPSULE | Refills: 0 | Status: SHIPPED | OUTPATIENT
Start: 2024-11-26

## 2024-11-26 RX ORDER — ACETAMINOPHEN 325 MG/1
650 TABLET ORAL EVERY 4 HOURS PRN
Qty: 60 TABLET | Refills: 0 | Status: SHIPPED | OUTPATIENT
Start: 2024-11-26

## 2024-11-26 RX ORDER — IBUPROFEN 800 MG/1
800 TABLET, FILM COATED ORAL EVERY 6 HOURS PRN
Qty: 60 TABLET | Refills: 1 | Status: SHIPPED | OUTPATIENT
Start: 2024-11-26

## 2024-11-26 RX ORDER — NIFEDIPINE 30 MG/1
60 TABLET, EXTENDED RELEASE ORAL DAILY
Status: DISCONTINUED | OUTPATIENT
Start: 2024-11-27 | End: 2024-11-28 | Stop reason: HOSPADM

## 2024-11-26 RX ORDER — NIFEDIPINE 30 MG/1
30 TABLET, EXTENDED RELEASE ORAL AT BEDTIME
Status: DISCONTINUED | OUTPATIENT
Start: 2024-11-26 | End: 2024-11-27

## 2024-11-26 RX ADMIN — ACETAMINOPHEN 650 MG: 325 TABLET, FILM COATED ORAL at 07:34

## 2024-11-26 RX ADMIN — NIFEDIPINE 60 MG: 30 TABLET, FILM COATED, EXTENDED RELEASE ORAL at 08:03

## 2024-11-26 RX ADMIN — IBUPROFEN 800 MG: 800 TABLET, FILM COATED ORAL at 22:10

## 2024-11-26 RX ADMIN — DOCUSATE SODIUM 100 MG: 100 CAPSULE, LIQUID FILLED ORAL at 08:03

## 2024-11-26 RX ADMIN — LEVOTHYROXINE SODIUM 50 MCG: 0.05 TABLET ORAL at 07:34

## 2024-11-26 RX ADMIN — IBUPROFEN 800 MG: 800 TABLET, FILM COATED ORAL at 01:47

## 2024-11-26 RX ADMIN — IBUPROFEN 800 MG: 800 TABLET, FILM COATED ORAL at 09:51

## 2024-11-26 RX ADMIN — NIFEDIPINE 30 MG: 30 TABLET, FILM COATED, EXTENDED RELEASE ORAL at 21:54

## 2024-11-26 RX ADMIN — IBUPROFEN 800 MG: 800 TABLET, FILM COATED ORAL at 16:22

## 2024-11-26 ASSESSMENT — ACTIVITIES OF DAILY LIVING (ADL)
ADLS_ACUITY_SCORE: 20

## 2024-11-26 NOTE — PLAN OF CARE
"Vital signs stable although blood pressure continues to be elevated. Denies headache, vision changes, epigastric pain, nausea. Fundus is firm and midline, scant lochia. Voiding spontaneously. Ambulating independently, denies dizziness. Reports pain is minimal with ibuprofen and tylenol. Breastfeeding every 2-3 hours, latch observed. Attentive to  cues.    Problem: Adult Inpatient Plan of Care  Goal: Plan of Care Review  Description: The Plan of Care Review/Shift note should be completed every shift.  The Outcome Evaluation is a brief statement about your assessment that the patient is improving, declining, or no change.  This information will be displayed automatically on your shift  note.  2024 by Emily Albright RN  Outcome: Progressing  Flowsheets (Taken 2024)  Plan of Care Reviewed With:   patient   spouse  Overall Patient Progress: improving  2024 by Emily Albright, RN  Outcome: Progressing  Goal: Patient-Specific Goal (Individualized)  Description: You can add care plan individualizations to a care plan. Examples of Individualization might be:  \"Parent requests to be called daily at 9am for status\", \"I have a hard time hearing out of my right ear\", or \"Do not touch me to wake me up as it startles  me\".  2024 by Emily Albright RN  Outcome: Progressing  2024 by Emily Albright, RN  Outcome: Progressing  Goal: Absence of Hospital-Acquired Illness or Injury  2024 by Emily Albright, RN  Outcome: Progressing  2024 124 by Emily Albright, RN  Outcome: Progressing  Intervention: Prevent Skin Injury  Recent Flowsheet Documentation  Taken 2024 172 by Emily Albright, RN  Body Position: position changed independently  Taken 2024 0930 by Emily Albright, RN  Body Position: position changed independently  Goal: Optimal Comfort and Wellbeing  2024 by Emily Albright, RN  Outcome: Progressing  2024 by Jose Ramon, " CARLYLE Diaz  Outcome: Progressing  Intervention: Provide Person-Centered Care  Recent Flowsheet Documentation  Taken 11/25/2024 1729 by Emily Albright RN  Trust Relationship/Rapport:   care explained   choices provided   questions answered   questions encouraged   reassurance provided  Taken 11/25/2024 0930 by Emily Albright RN  Trust Relationship/Rapport:   care explained   choices provided   questions answered   questions encouraged   reassurance provided  Goal: Readiness for Transition of Care  11/25/2024 1818 by Emily Albright RN  Outcome: Progressing  11/25/2024 1248 by Emily Albright RN  Outcome: Progressing     Problem: Hypertensive Disorders in Pregnancy  Goal: Patient-Fetal Stabilization  11/25/2024 1818 by Emily Albright RN  Outcome: Progressing  11/25/2024 1248 by Emily Albright RN  Outcome: Progressing     Problem: Fall Injury Risk  Goal: Absence of Fall and Fall-Related Injury  11/25/2024 1818 by Emily Albright RN  Outcome: Progressing  11/25/2024 1248 by Emily Albright RN  Outcome: Progressing     Problem: Postpartum (Vaginal Delivery)  Goal: Successful Parent Role Transition  11/25/2024 1818 by Emily Albright RN  Outcome: Progressing  11/25/2024 1248 by Emily Albright RN  Outcome: Progressing  Intervention: Support Parent Role Transition  Recent Flowsheet Documentation  Taken 11/25/2024 1729 by Emily Albright RN  Supportive Measures:   active listening utilized   decision-making supported   positive reinforcement provided   self-reflection promoted  Parent-Child Attachment Promotion:   caring behavior modeled   cue recognition promoted   interaction encouraged   parent/caregiver presence encouraged   participation in care promoted   positive reinforcement provided   rooming-in promoted  Taken 11/25/2024 0930 by Emily Albright, RN  Supportive Measures:   active listening utilized   decision-making supported   positive reinforcement provided   self-reflection promoted  Parent-Child Attachment  Promotion:   caring behavior modeled   cue recognition promoted   interaction encouraged   parent/caregiver presence encouraged   participation in care promoted   positive reinforcement provided   rooming-in promoted  Goal: Hemostasis  11/25/2024 1818 by Emily Albright RN  Outcome: Progressing  11/25/2024 1248 by Emily Albright RN  Outcome: Progressing  Goal: Absence of Infection Signs and Symptoms  11/25/2024 1818 by Emily Albright RN  Outcome: Progressing  11/25/2024 1248 by Emily Albright RN  Outcome: Progressing  Goal: Anesthesia/Sedation Recovery  11/25/2024 1818 by Emily Albright RN  Outcome: Progressing  11/25/2024 1248 by Emily Albright RN  Outcome: Progressing  Goal: Optimal Pain Control and Function  11/25/2024 1818 by Emily Albright RN  Outcome: Progressing  11/25/2024 1248 by Emily Albright RN  Outcome: Progressing  Goal: Effective Urinary Elimination  11/25/2024 1818 by Emily Albright RN  Outcome: Progressing  11/25/2024 1248 by Emily Albright RN  Outcome: Progressing   Goal Outcome Evaluation:      Plan of Care Reviewed With: patient, spouse    Overall Patient Progress: improvingOverall Patient Progress: improving

## 2024-11-26 NOTE — PLAN OF CARE
"Goal Outcome Evaluation:      Plan of Care Reviewed With: patient, spouse    Overall Patient Progress: improvingOverall Patient Progress: improving    Outcome Evaluation: mild BPs. Procardia to manage.    Vital signs within normal limits. Postpartum checks within normal limits - see flow record. Patient eating and drinking normally. Patient able to empty bladder independently and is up ambulating. Patient performing self cares, is able to care for infant and is breastfeeding every 2-3 hours. Patient medicated with ibuprofen and tylenol during the shift for pain. See MAR. Adequate pain control noted by patient. Patient education done, see flow record. Positive attachment behaviors observed with infant. Patient's spouse present this shift. Continue current plan of care.  Anticipate discharge in 1-2 days.       Problem: Adult Inpatient Plan of Care  Goal: Plan of Care Review  Description: The Plan of Care Review/Shift note should be completed every shift.  The Outcome Evaluation is a brief statement about your assessment that the patient is improving, declining, or no change.  This information will be displayed automatically on your shift  note.  Outcome: Progressing  Flowsheets (Taken 11/26/2024 1649)  Outcome Evaluation: mild BPs. Procardia to manage.  Plan of Care Reviewed With:   patient   spouse  Overall Patient Progress: improving  Goal: Patient-Specific Goal (Individualized)  Description: You can add care plan individualizations to a care plan. Examples of Individualization might be:  \"Parent requests to be called daily at 9am for status\", \"I have a hard time hearing out of my right ear\", or \"Do not touch me to wake me up as it startles  me\".  Outcome: Progressing  Goal: Absence of Hospital-Acquired Illness or Injury  Outcome: Progressing  Intervention: Prevent Skin Injury  Recent Flowsheet Documentation  Taken 11/26/2024 1612 by Mansi Smith RN  Body Position: supine, head elevated  Taken 11/26/2024 0811 " by Mansi Smith RN  Body Position: supine, head elevated  Intervention: Prevent Infection  Recent Flowsheet Documentation  Taken 11/26/2024 1612 by Mansi Smith RN  Infection Prevention:   hand hygiene promoted   rest/sleep promoted  Taken 11/26/2024 0811 by Mansi Smith RN  Infection Prevention:   hand hygiene promoted   rest/sleep promoted  Goal: Optimal Comfort and Wellbeing  Outcome: Progressing  Intervention: Provide Person-Centered Care  Recent Flowsheet Documentation  Taken 11/26/2024 1612 by Mansi Smith RN  Trust Relationship/Rapport:   care explained   choices provided   emotional support provided   empathic listening provided   questions answered   questions encouraged   reassurance provided   thoughts/feelings acknowledged  Taken 11/26/2024 0811 by Mansi Smith RN  Trust Relationship/Rapport:   care explained   choices provided   emotional support provided   empathic listening provided   questions answered   questions encouraged   reassurance provided   thoughts/feelings acknowledged  Goal: Readiness for Transition of Care  Outcome: Progressing     Problem: Labor  Goal: Stable Fetal Wellbeing  Intervention: Promote and Monitor Fetal Wellbeing  Recent Flowsheet Documentation  Taken 11/26/2024 1612 by Mansi Smith RN  Body Position: supine, head elevated  Taken 11/26/2024 0811 by Mansi Smith RN  Body Position: supine, head elevated  Goal: Absence of Infection Signs and Symptoms  Intervention: Prevent or Manage Infection  Recent Flowsheet Documentation  Taken 11/26/2024 1612 by Mansi Smith RN  Infection Prevention:   hand hygiene promoted   rest/sleep promoted  Taken 11/26/2024 0811 by Mansi Smith RN  Infection Prevention:   hand hygiene promoted   rest/sleep promoted     Problem: Hypertensive Disorders in Pregnancy  Goal: Patient-Fetal Stabilization  Outcome: Progressing     Problem: Fall Injury Risk  Goal: Absence of Fall and Fall-Related  Injury  Outcome: Progressing     Problem: Postpartum (Vaginal Delivery)  Goal: Successful Parent Role Transition  Outcome: Progressing  Goal: Hemostasis  Outcome: Progressing  Goal: Absence of Infection Signs and Symptoms  Outcome: Progressing  Goal: Anesthesia/Sedation Recovery  Outcome: Progressing  Goal: Optimal Pain Control and Function  Outcome: Progressing  Goal: Effective Urinary Elimination  Outcome: Progressing

## 2024-11-26 NOTE — PROVIDER NOTIFICATION
11/26/24 1630   Provider Notification   Provider Name/Title O'Harry   Method of Notification Electronic Page   Request Evaluate-Remote   Notification Reason Vital Signs Change     Patient BP moderate 141/78 this afternoon after being 129/83 this morning. Following up to see if you want to start her on additional meds for her BPs? She also requested we stop measuring her urine.     MD paged. Per MD, okay to stop measuring output. Recheck BP at 1800 and 2000 then update MD with data points to assess for need for additional medications.

## 2024-11-26 NOTE — PLAN OF CARE
Goal Outcome Evaluation:  VSS, besides elevated BP (see flowsheets). Up independently. Tolerating regular diet. Breastfeeding  about every 2-3 hours. Encouraging hand expression following feeds.  cluster feeding overnight. One time supplement of about 9ml of DM. Mild pain controlled with ibuprofen. Bonding well with . No support person present @ bedside. Denies preeclampsia symptoms. DTR WDL, no beats of clonus present. Fundus firm @ midline, lochia WDL. Voiding adequately.        Plan of Care Reviewed With: patient    Overall Patient Progress: improvingOverall Patient Progress: improving      Problem: Adult Inpatient Plan of Care  Goal: Plan of Care Review  Description: The Plan of Care Review/Shift note should be completed every shift.  The Outcome Evaluation is a brief statement about your assessment that the patient is improving, declining, or no change.  This information will be displayed automatically on your shift  note.  Outcome: Progressing  Flowsheets (Taken 2024 024)  Plan of Care Reviewed With: patient  Overall Patient Progress: improving  Goal: Absence of Hospital-Acquired Illness or Injury  Intervention: Prevent Skin Injury  Recent Flowsheet Documentation  Taken 2024 by Melinda Orozco RN  Body Position: position changed independently  Goal: Optimal Comfort and Wellbeing  Intervention: Provide Person-Centered Care  Recent Flowsheet Documentation  Taken 2024 by Melinda Orozco RN  Trust Relationship/Rapport:   choices provided   care explained     Problem: Labor  Goal: Stable Fetal Wellbeing  Intervention: Promote and Monitor Fetal Wellbeing  Recent Flowsheet Documentation  Taken 2024 by Melinda Orozco RN  Body Position: position changed independently

## 2024-11-26 NOTE — PROGRESS NOTES
[facility]   Post-partum Note    Name:  Cherie Israel  MRN: [id number]    S: Patient states she is doing well, recovery is WAY better than her first.  Pain is controlled.  Tolerating regular diet without nausea or vomiting. Voiding spontaneously, passing flatus but no bowel movement as of yet.   Ambulating without dizziness.  Lochia similar to light menses.  Breast feeding. Desires vasectomy for contraception.  Patient currently denies any headache, vision changes, shortness of breath, chest pain or RUQ/epigastric abdominal pain.  Plans discharge ASAP.    O:   Patient Vitals for the past 24 hrs:   BP Temp Temp src Pulse Resp Weight   24 0811 135/81 97.9  F (36.6  C) Oral 77 18 --   24 0640 -- -- -- -- -- 86.3 kg (190 lb 4.8 oz)   24 0337 (!) 145/93 -- -- 66 -- --   24 2332 135/82 -- -- 67 -- --   24 2119 (!) 149/85 97.9  F (36.6  C) Oral 63 16 --   24 1923 (!) 155/94 -- -- 64 -- --   24 1730 (!) 153/93 -- -- 60 -- --   24 1601 (!) 154/85 98.4  F (36.9  C) Oral 59 16 --   24 1317 (!) 135/91 -- -- 62 -- --   24 1139 (!) 145/79 -- -- 60 -- --     Gen:  Resting comfortably, NAD  CV:  Regular rate  Pulm:  Non-labored breathing.  No cough or wheezing.   Abd:  Soft, appropriately tender to palpation, non-distended.  Fundus at  umbilicus, firm and non-tender.  Ext:  Non-tender, 1+ LE edema b/l    Assessment/Plan:  34 year old  on PPD #2 s/p .  Continue with routine postpartum management.     Pre-E without SF (diagnosed prenatally):   - Vitals ever 4 hours   - daily weight   - She received Procardia Xl 30 mg BID yesterday, plan for 60 mg today   - Reviewed high chance of needing to adjust medications more today and likely will discharge home tomorrow  - HELLP labs WNL   - Will need HH referral and close outpatient follow-up in 3-5 days from discharge ; she was made aware of these and recommendations to schedule for Mon/Tu of next week    - has BP cuff at home    Hypothyroidism:   - Continue PTA Synthroid dose     Pain: Well-controlled with ibuprofen and tylenol  Hgb: 12.9>QBL 168cc> 11.8. VSS as noted above, asymptomatic.   GI:  BID Senna/Colace.  PRN Simethicone.   PPx:  Encouraged ambulation   Rh: Positive  Rubella: Immune  Feed: Breast  BC: Vasectomy  Dispo: Plan for home on PPD#3-4 pending BP control.     Twila Vides MD   Pager: [phone]   November 26, 2024

## 2024-11-26 NOTE — LACTATION NOTE
This note was copied from a baby's chart.  Lactation visit; Cherie reports Brenda cluster feeding- starting to feel breast changes and hearing swallows but states left nipple is sore.   Writer did 48 hour weight- infant at 9.1% weight loss but was only 1.1%  from 24 hour weight loss.  Cherie would like writers assistance for deeper latch. Brenda brought STS to left breast in football hold. Brenda initially latching just to tip of nipple- reviewed how to break seal. Brenda quick close mouth- reviewed deep latch techniques and how to help with deep latch. Cherie mentions- she had been positioning belly up at times- reviewed appropriate body position.   After couple reattempts was able to achieve deeper latch- Cherie reports feels better. Reviewed breast compressions when needed. Brenda with multiple swallows. Switched to second side and with a couple reattempts achieved deep latch- few swallows heard.   Cherie asking foods to help bring milk in- reviewed supportive diet but encouraged hand expression and discussed stimulation if wanting to be proactive in brining milk in.  All questions answered.  Primary RN updated.

## 2024-11-26 NOTE — PROGRESS NOTES
PROGRESS NOTE    Vitals:    11/25/24 1139 11/25/24 1317 11/25/24 1601 11/25/24 1730   BP: (!) 145/79 (!) 135/91 (!) 154/85 (!) 153/93   BP Location: Left arm Left arm Left arm    Patient Position: Semi-Myers's Semi-Myers's Semi-Myers's    Cuff Size: Adult Regular Adult Regular Adult Regular    Pulse: 60 62 59 60   Resp:   16    Temp:   98.4  F (36.9  C)    TempSrc:   Oral    SpO2:           - will add Procardia 30 mg XR once now   - continue BP q 2 hrs  - she will be taking Procardia 60 mg XR tomorrow am as schedule if BP's are wnl  - if BP wnl, then ok to change to q 4 hrs   - may need further antihypertensive meds    Dr. Villa   [phone]

## 2024-11-26 NOTE — PROVIDER NOTIFICATION
11/25/24 1730   Provider Notification   Provider Name/Title Dr. Villa   Method of Notification Electronic Page   Request Evaluate-Remote   Notification Reason Vital Signs Change     BP continues to be elevated, most recently 153/93. Orders received for 30 mg Procardia now and morning dose changed to 60 mg going forward. Continue to monitor blood pressure every 2 hours.

## 2024-11-26 NOTE — PROVIDER NOTIFICATION
11/25/24 4496   Provider Notification   Provider Name/Title Dr. Villa   Method of Notification Electronic Page   Request Evaluate-Remote   Notification Reason Status Update     Dr. Villa updated on last 3 blood pressure, last one being WDL. Provider agreeable to Q4 blood pressures overnight.

## 2024-11-27 PROCEDURE — 250N000013 HC RX MED GY IP 250 OP 250 PS 637: Performed by: STUDENT IN AN ORGANIZED HEALTH CARE EDUCATION/TRAINING PROGRAM

## 2024-11-27 PROCEDURE — 250N000013 HC RX MED GY IP 250 OP 250 PS 637: Performed by: OBSTETRICS & GYNECOLOGY

## 2024-11-27 PROCEDURE — 120N000001 HC R&B MED SURG/OB

## 2024-11-27 RX ORDER — LABETALOL 200 MG/1
200 TABLET, FILM COATED ORAL EVERY 8 HOURS SCHEDULED
Status: DISCONTINUED | OUTPATIENT
Start: 2024-11-27 | End: 2024-11-28 | Stop reason: HOSPADM

## 2024-11-27 RX ORDER — NIFEDIPINE 30 MG/1
60 TABLET, EXTENDED RELEASE ORAL AT BEDTIME
Status: DISCONTINUED | OUTPATIENT
Start: 2024-11-27 | End: 2024-11-28 | Stop reason: HOSPADM

## 2024-11-27 RX ADMIN — LABETALOL HYDROCHLORIDE 200 MG: 200 TABLET, FILM COATED ORAL at 13:04

## 2024-11-27 RX ADMIN — LABETALOL HYDROCHLORIDE 200 MG: 200 TABLET, FILM COATED ORAL at 20:57

## 2024-11-27 RX ADMIN — LEVOTHYROXINE SODIUM 50 MCG: 0.05 TABLET ORAL at 06:28

## 2024-11-27 RX ADMIN — NIFEDIPINE 60 MG: 30 TABLET, FILM COATED, EXTENDED RELEASE ORAL at 08:28

## 2024-11-27 RX ADMIN — IBUPROFEN 800 MG: 800 TABLET, FILM COATED ORAL at 03:34

## 2024-11-27 RX ADMIN — IBUPROFEN 800 MG: 800 TABLET, FILM COATED ORAL at 16:23

## 2024-11-27 RX ADMIN — IBUPROFEN 800 MG: 800 TABLET, FILM COATED ORAL at 22:26

## 2024-11-27 RX ADMIN — NIFEDIPINE 60 MG: 30 TABLET, FILM COATED, EXTENDED RELEASE ORAL at 22:26

## 2024-11-27 RX ADMIN — ACETAMINOPHEN 650 MG: 325 TABLET, FILM COATED ORAL at 06:28

## 2024-11-27 RX ADMIN — DOCUSATE SODIUM 100 MG: 100 CAPSULE, LIQUID FILLED ORAL at 08:28

## 2024-11-27 RX ADMIN — IBUPROFEN 800 MG: 800 TABLET, FILM COATED ORAL at 10:32

## 2024-11-27 ASSESSMENT — ACTIVITIES OF DAILY LIVING (ADL)
ADLS_ACUITY_SCORE: 20

## 2024-11-27 NOTE — PROGRESS NOTES
[facility]   Post-partum Note    Name:  Cherie Israel  MRN: [id number]    S: Patient doing well. Pain is controlled.  Tolerating regular diet. Voiding spontaneously. Ambulating.  Lochia wnl.  Breast feeding.  Patient currently denies any headache, vision changes, shortness of breath, chest pain or RUQ/epigastric abdominal pain. Patient desires discharge home today.     O:   Patient Vitals for the past 24 hrs:   BP Temp Temp src Pulse Resp   24 0332 (!) 143/81 97.3  F (36.3  C) Oral 64 16   24 2219 -- 97.9  F (36.6  C) Oral -- 16   24 2153 136/83 -- -- 68 --   24 134/88 -- -- 81 --   24 1806 (!) 140/83 -- -- 74 --   24 1612 (!) 141/78 -- -- 72 --   24 1205 126/83 -- -- 63 --   24 0811 135/81 97.9  F (36.6  C) Oral 77 18     Gen:  Resting comfortably, NAD  CV:  Regular rate  Pulm:  Non-labored breathing.  No cough or wheezing.   Abd:  Soft, appropriately tender to palpation, non-distended.  Fundus at  umbilicus, firm and non-tender.  Ext:  Non-tender, 1+ LE edema b/l    Assessment/Plan:  34 year old  on PPD #3 s/p .  Continue with routine postpartum management.     Pre-E without SF (diagnosed prenatally):   - Vitals ever 4 hours   - daily weight   - HELLP labs WNL   - Will need HH referral and close outpatient follow-up in 3-5 days from discharge ; she was made aware of these and recommendations to schedule for Mon/Tues of next week   - has BP cuff at home  - Yesterday increased BP dose to 60mg/30mg yet continued mild range BP's overnight.   + Increase to 60mg BID Procardia XR PO.   + May add 200mg PO Labetalol TID this afternoon if BP continues to be mild range.    Hypothyroidism:   - Continue PTA Synthroid dose     Pain: Well-controlled with ibuprofen and tylenol  Hgb: 12.9>QBL 168cc> 11.8. VSS as noted above, asymptomatic.   GI:  BID Senna/Colace.  PRN Simethicone.   PPx:  Encouraged ambulation   Rh: Positive  Rubella:  Immune  Feed: Breast  BC: Vasectomy    Dispo: Plan for home on PPD#4 pending BP control. We discussed groups policy to not recommend discharge home on same day when mild range BP's and changes in BP medications to keep patient's safe upon discharge and decrease risk of morbidity/mortality, ER visits, re-admissions, and cost. We discussed the stx of pre-e, signs and stx of severe features. We discussed the physiology of postpartum fluid dynamics and that 3rd spaced fluid is often resorbed and diuresed postpartum days 3-7 which can increase BP further which does put postpartum patient's at risk of severe features of pre-e including but not limited to stroke. We discussed the r/b/a of staying in hospital which I recommend until BP at goal vs discharging home AMA. Patient and I, with her  present, did discuss the reasoning and steps of AMA discharge. And, if patient feels informed, then she can discharge home and we will make a plan so we can continue to support patient with care postpartum. Patient will further discuss with her . In the meantime, will check BP's q2hrs while patient is not sleeping. If BP is mild range again, will add labetlol PO as patient regimen has now been increased to 60mg BID. We discussed that with addition of 30mg PO Procardia XR last evening not fully controlling her BP overnight, that I have increased her evening dose for this evening to 60mg which means her procardia dose is maxed to 120mg q/day. Therefore, additional BP medication requirements would be next to add second medication. By end of our discussion, all questions were answered to the best of my ability. Discussed with patient if she decides to discharge home AMA, that I am happy to return to bedside to further discuss risks, protocols, plan, and any additional questions or clarifications she may have. Upon discharge, will plan on ensuring patient has BP cuff, BP med Rx, Home health care referral for BP check. Pt  reports she has postpartum BP check visit next week on Monday in the office. After rounding visit, I discussed plan of care with patient's nurse. She will send me a [employer] message if any further mild range BP and/or if patient decides she wishes to discharge home today AMA.

## 2024-11-27 NOTE — PROVIDER NOTIFICATION
"   11/26/24 2015   Provider Notification   Provider Name/Title O\"Harry   Method of Notification Electronic Page   Request Evaluate-Remote   Notification Reason Vital Signs Change;Status Update     Dr. Vides notified regarding last 2 blood pressures. Provider ordered extra dose of procardia now. Plan is to get blood pressures every 4-6 hours/ with feeds. Provider would like to be notified for severe range pressures. If treatment needed provider would like hydralazine protocol.   "

## 2024-11-27 NOTE — PLAN OF CARE
"Goal Outcome Evaluation:      Plan of Care Reviewed With: patient, spouse    Overall Patient Progress: improvingOverall Patient Progress: improving    Outcome Evaluation: moderate BPs. Labetolol started.    Vital signs within normal limits. Postpartum checks within normal limits - see flow record. Patient eating and drinking normally. Patient able to empty bladder independently and is up ambulating. Patient performing self cares, is able to care for infant and is breastfeeding every 2-3 hours. Patient medicated with ibuprofen and tylenol during the shift for pain. See MAR. Adequate pain control noted by patient. Patient education done, see flow record. Positive attachment behaviors observed with infant. Patient's spouse present this shift. Continue current plan of care.  Anticipate discharge tomorrow pending BP.       Problem: Adult Inpatient Plan of Care  Goal: Plan of Care Review  Description: The Plan of Care Review/Shift note should be completed every shift.  The Outcome Evaluation is a brief statement about your assessment that the patient is improving, declining, or no change.  This information will be displayed automatically on your shift  note.  Outcome: Progressing  Flowsheets (Taken 11/27/2024 1340)  Outcome Evaluation: moderate BPs. Labetolol started.  Plan of Care Reviewed With:   patient   spouse  Overall Patient Progress: improving  Goal: Patient-Specific Goal (Individualized)  Description: You can add care plan individualizations to a care plan. Examples of Individualization might be:  \"Parent requests to be called daily at 9am for status\", \"I have a hard time hearing out of my right ear\", or \"Do not touch me to wake me up as it startles  me\".  Outcome: Progressing  Goal: Absence of Hospital-Acquired Illness or Injury  Outcome: Progressing  Intervention: Prevent Skin Injury  Recent Flowsheet Documentation  Taken 11/27/2024 0895 by Mansi Smith RN  Body Position: supine, head elevated  Goal: " Optimal Comfort and Wellbeing  Outcome: Progressing  Intervention: Provide Person-Centered Care  Recent Flowsheet Documentation  Taken 11/27/2024 0831 by Mansi Smith RN  Trust Relationship/Rapport:   care explained   choices provided  Goal: Readiness for Transition of Care  Outcome: Progressing     Problem: Labor  Goal: Stable Fetal Wellbeing  Intervention: Promote and Monitor Fetal Wellbeing  Recent Flowsheet Documentation  Taken 11/27/2024 0831 by Mansi Smith RN  Body Position: supine, head elevated     Problem: Hypertensive Disorders in Pregnancy  Goal: Patient-Fetal Stabilization  Outcome: Progressing     Problem: Fall Injury Risk  Goal: Absence of Fall and Fall-Related Injury  Outcome: Progressing

## 2024-11-27 NOTE — LACTATION NOTE
This note was copied from a baby's chart.  Lactation visit; Cherie reporting milk in today. Brenda currently latched on left breast in football hold- Cherie independent with latch and observed to have deep latch. Brenda has 1:1 suck/swallow ratio and active for 10-12 minutes. Reinforced tips on achieving deep latch- Cherie states right side is better than left but improving with some latches. Brenda quick to close mouth and at times moves tongue up before latching at breast- discussed tips/positioning to help. Cherie demonstrates understanding. Reviewed outpatient lactation resources.   All questions answered.

## 2024-11-27 NOTE — PLAN OF CARE
Goal Outcome Evaluation:    VSS, besides few mild range BP. Up independently. Tolerating regular diet. Breastfeeding  about every 2-3 hours independently. Minimal pain controlled with ibuprofen. Bonding well with . No support person present at bedside. Denies preeclampsia symptoms. DTR WDL, no beats of clonus. Fundus firm @ midline, lochia WDL. Voiding adequately.        Plan of Care Reviewed With: patient    Overall Patient Progress: improvingOverall Patient Progress: improving      Problem: Adult Inpatient Plan of Care  Goal: Plan of Care Review  Description: The Plan of Care Review/Shift note should be completed every shift.  The Outcome Evaluation is a brief statement about your assessment that the patient is improving, declining, or no change.  This information will be displayed automatically on your shift  note.  Outcome: Progressing  Flowsheets (Taken 2024 0402)  Plan of Care Reviewed With: patient  Overall Patient Progress: improving  Goal: Absence of Hospital-Acquired Illness or Injury  Intervention: Prevent Skin Injury  Recent Flowsheet Documentation  Taken 2024 by Melinda Orozco RN  Body Position: position changed independently  Goal: Optimal Comfort and Wellbeing  Intervention: Monitor Pain and Promote Comfort  Recent Flowsheet Documentation  Taken 2024 by Melinda Orozco RN  Pain Management Interventions: medication (see MAR)  Intervention: Provide Person-Centered Care  Recent Flowsheet Documentation  Taken 2024 by Melinda Orozco RN  Trust Relationship/Rapport:   care explained   choices provided     Problem: Labor  Goal: Stable Fetal Wellbeing  Intervention: Promote and Monitor Fetal Wellbeing  Recent Flowsheet Documentation  Taken 2024 by Melinda Orozco RN  Body Position: position changed independently     Problem: Postpartum (Vaginal Delivery)  Goal: Successful Parent Role Transition  Intervention: Support Parent Role  Transition  Recent Flowsheet Documentation  Taken 11/26/2024 2210 by Melinda Orozco RN  Supportive Measures: active listening utilized  Parent-Child Attachment Promotion: caring behavior modeled  Goal: Optimal Pain Control and Function  Intervention: Prevent or Manage Pain  Recent Flowsheet Documentation  Taken 11/26/2024 2210 by Melinda Orozco RN  Pain Management Interventions: medication (see MAR)

## 2024-11-28 VITALS
TEMPERATURE: 97.9 F | RESPIRATION RATE: 16 BRPM | WEIGHT: 188.27 LBS | DIASTOLIC BLOOD PRESSURE: 72 MMHG | HEART RATE: 72 BPM | SYSTOLIC BLOOD PRESSURE: 122 MMHG | BODY MASS INDEX: 27.8 KG/M2 | OXYGEN SATURATION: 94 %

## 2024-11-28 PROCEDURE — 250N000013 HC RX MED GY IP 250 OP 250 PS 637: Performed by: OBSTETRICS & GYNECOLOGY

## 2024-11-28 PROCEDURE — 250N000013 HC RX MED GY IP 250 OP 250 PS 637: Performed by: STUDENT IN AN ORGANIZED HEALTH CARE EDUCATION/TRAINING PROGRAM

## 2024-11-28 PROCEDURE — 999N000080 HC STATISTIC IP LACTATION SERVICES 16-30 MIN

## 2024-11-28 RX ORDER — LABETALOL 200 MG/1
200 TABLET, FILM COATED ORAL EVERY 8 HOURS
Qty: 90 TABLET | Refills: 0 | Status: SHIPPED | OUTPATIENT
Start: 2024-11-28 | End: 2024-12-28

## 2024-11-28 RX ADMIN — NIFEDIPINE 60 MG: 30 TABLET, FILM COATED, EXTENDED RELEASE ORAL at 09:07

## 2024-11-28 RX ADMIN — LABETALOL HYDROCHLORIDE 200 MG: 200 TABLET, FILM COATED ORAL at 05:08

## 2024-11-28 RX ADMIN — LEVOTHYROXINE SODIUM 50 MCG: 0.05 TABLET ORAL at 08:13

## 2024-11-28 RX ADMIN — DOCUSATE SODIUM 100 MG: 100 CAPSULE, LIQUID FILLED ORAL at 08:13

## 2024-11-28 RX ADMIN — IBUPROFEN 800 MG: 800 TABLET, FILM COATED ORAL at 05:09

## 2024-11-28 ASSESSMENT — ACTIVITIES OF DAILY LIVING (ADL)
ADLS_ACUITY_SCORE: 20

## 2024-11-28 NOTE — PLAN OF CARE
Data: Vital signs within normal limits on PO Labetalol and Procardia. Postpartum checks within normal limits - see flow record. Patient eating and drinking normally. Patient able to empty bladder independently and is up ambulating. No apparent signs of infection.  Patient performing self cares and is able to care for infant.  Action: Patient medicated during the shift for pain and cramping. See MAR. Patient reassessed within 1 hour after each medication and pain was improved - patient stated she was comfortable. Patient education done about monitoring BP at home and follow up appointment, no further questions.   Response: Positive attachment behaviors observed with infant. Support person present.   Plan: Anticipate discharge to home this morning.    Problem: Adult Inpatient Plan of Care  Goal: Plan of Care Review    Outcome: Met  Flowsheets (Taken 11/28/2024 0929)  Outcome Evaluation: d/c to home, BP WDL.  Plan of Care Reviewed With: patient  Overall Patient Progress: improving  Goal: Patient-Specific Goal (Individualized)    Outcome: Met  Goal: Absence of Hospital-Acquired Illness or Injury  Outcome: Met  Intervention: Prevent Skin Injury  Recent Flowsheet Documentation  Taken 11/28/2024 0900 by Michelle Balderrama, RN  Body Position: position changed independently  Intervention: Prevent Infection  Recent Flowsheet Documentation  Taken 11/28/2024 0900 by Michelle Balderrama, RN  Infection Prevention:   hand hygiene promoted   rest/sleep promoted  Goal: Optimal Comfort and Wellbeing  Outcome: Met  Intervention: Provide Person-Centered Care  Recent Flowsheet Documentation  Taken 11/28/2024 0900 by Michelle Balderrama, RN  Trust Relationship/Rapport:   care explained   choices provided   questions answered  Goal: Readiness for Transition of Care  Outcome: Met     Problem: Hypertensive Disorders in Pregnancy  Goal: Patient-Fetal Stabilization  Outcome: Met     Problem: Fall Injury Risk  Goal: Absence of Fall and Fall-Related  Injury  Outcome: Met     Problem: Postpartum (Vaginal Delivery)  Goal: Successful Parent Role Transition  Outcome: Met  Intervention: Support Parent Role Transition  Recent Flowsheet Documentation  Taken 11/28/2024 0900 by Michelle Balderrama RN  Supportive Measures: active listening utilized  Parent-Child Attachment Promotion:   caring behavior modeled   cue recognition promoted   rooming-in promoted  Goal: Hemostasis  Outcome: Met  Goal: Absence of Infection Signs and Symptoms  Outcome: Met  Goal: Anesthesia/Sedation Recovery  Outcome: Met  Goal: Optimal Pain Control and Function  Outcome: Met  Intervention: Prevent or Manage Pain  Recent Flowsheet Documentation  Taken 11/28/2024 0900 by Michelle Balderrama, RN  Perineal Care:   perineal hygiene encouraged   perineal spray bottle/warm water use encouraged  Goal: Effective Urinary Elimination  Outcome: Met  Intervention: Monitor and Manage Urinary Retention  Recent Flowsheet Documentation  Taken 11/28/2024 0900 by Michelle Balderrama RN  Urinary Elimination Promotion: frequent voiding encouraged       Goal Outcome Evaluation:      Plan of Care Reviewed With: patient    Overall Patient Progress: improvingOverall Patient Progress: improving    Outcome Evaluation: d/c to home, BP WDL.

## 2024-11-28 NOTE — PLAN OF CARE
VSS.  Denies s/s PreE.  Pain well controlled. Fundal checks and bleeding WNL. Voiding without difficulty, frequent voiding encouraged.  Spouse at bedside overnight.      Problem: Adult Inpatient Plan of Care  Goal: Plan of Care Review    Outcome: Progressing  Flowsheets (Taken 11/28/2024 0520)  Plan of Care Reviewed With: patient  Overall Patient Progress: improving  Goal: Absence of Hospital-Acquired Illness or Injury  Intervention: Prevent Skin Injury  Recent Flowsheet Documentation  Taken 11/28/2024 0026 by Marianna Diaz RN  Body Position: position changed independently  Goal: Optimal Comfort and Wellbeing  Intervention: Provide Person-Centered Care  Recent Flowsheet Documentation  Taken 11/28/2024 0026 by Marianna Diaz RN  Trust Relationship/Rapport:   care explained   choices provided   questions answered     Problem: Labor  Goal: Stable Fetal Wellbeing  Intervention: Promote and Monitor Fetal Wellbeing  Recent Flowsheet Documentation  Taken 11/28/2024 0026 by Marianna Diaz RN  Body Position: position changed independently  Goal: Absence of Infection Signs and Symptoms  Intervention: Prevent or Manage Infection  Recent Flowsheet Documentation  Taken 11/28/2024 0026 by Marianna Diaz RN  Infection Management: aseptic technique maintained     Problem: Postpartum (Vaginal Delivery)  Goal: Successful Parent Role Transition  Intervention: Support Parent Role Transition  Recent Flowsheet Documentation  Taken 11/28/2024 0026 by Marianna Diaz RN  Supportive Measures: active listening utilized  Parent-Child Attachment Promotion:   interaction encouraged   parent/caregiver presence encouraged   participation in care promoted  Goal: Absence of Infection Signs and Symptoms  Intervention: Prevent or Manage Infection  Recent Flowsheet Documentation  Taken 11/28/2024 0026 by Marianna Diaz RN  Infection Management: aseptic technique maintained  Goal: Effective Urinary Elimination  Intervention: Monitor  and Manage Urinary Retention  Recent Flowsheet Documentation  Taken 11/28/2024 0026 by Marianna Diaz, RN  Urinary Elimination Promotion: frequent voiding encouraged

## 2024-11-28 NOTE — LACTATION NOTE
Lactation in to visit with patient. Baby just finished breastfeeding. Milk is in per Yuly. Reviewed signs of hydration. Yuly continues to work on getting baby to breast deeper on the left breast. Reviewed clogged ducts and engorgement. Encouraged frequent feeds. Gel pads given, has mother love cream. Home today.

## 2024-11-28 NOTE — DISCHARGE INSTRUCTIONS
Warning Signs after Having a Baby    Keep this paper on your fridge or somewhere else where you can see it.    Call your provider if you have any of these symptoms up to 12 weeks after having your baby.    Thoughts of hurting yourself or your baby  Pain in your chest or trouble breathing  Severe headache not helped by pain medicine  Eyesight concerns (blurry vision, seeing spots or flashes of light, other changes to eyesight)  Fainting, shaking or other signs of a seizure    Call 9-1-1 if you feel that it is an emergency.     The symptoms below can happen to anyone after giving birth. They can be very serious. Call your provider if you have any of these warning signs.    My provider s phone number: _______________________    Losing too much blood (hemorrhage)    Call your provider if you soak through a pad in less than an hour or pass blood clots bigger than a golf ball. These may be signs that you are bleeding too much.    Blood clots in the legs or lungs    After you give birth, your body naturally clots its blood to help prevent blood loss. Sometimes this increased clotting can happen in other areas of the body, like the legs or lungs. This can block your blood flow and be very dangerous.     Call your provider if you:  Have a red, swollen spot on the back of your leg that is warm or painful when you touch it.   Are coughing up blood.     Infection    Call your provider if you have any of these symptoms:  Fever of 100.4 F (38 C) or higher.  Pain or redness around your stitches if you had an incision.   Any yellow, white, or green fluid coming from places where you had stitches or surgery.    Mood Problems (postpartum depression)    Many people feel sad or have mood changes after having a baby. But for some people, these mood swings are worse.     Call your provider right away if you feel so anxious or nervous that you can't care for yourself or your baby.    Preeclampsia (high blood pressure)    Even if you  didn't have high blood pressure when you were pregnant, you are at risk for the high blood pressure disease called preeclampsia. This risk can last up to 12 weeks after giving birth.     Call your provider if you have:   Pain on your right side under your rib cage  Sudden swelling in the hands and face    Remember: You know your body. If something doesn't feel right, get medical help.     For informational purposes only. Not to replace the advice of your health care provider. Copyright 2020 [facility]. All rights reserved. Clinically reviewed by Leslie Lo, RNC-OB, MSN. [email or web address] [id number] - Rev 02/23.

## 2024-11-28 NOTE — PROVIDER NOTIFICATION
11/27/24 1800   Provider Notification   Provider Name/Title Brenna   Method of Notification Phone   Request Evaluate-Remote   Notification Reason Status Update     Discussed BPs. Plan to check before evening dose and nursing is to call Brenna prior to administration for possible dose increase.

## 2024-11-28 NOTE — PROGRESS NOTES
[facility]  Postpartum Progress Note    Cherie Israel YOB: 1990   MRN [id number] Primary OB: [facility]     SUBJECTIVE   Cherie Israel is a 34 year old  s/p  on  at 38w4d.     Cherie reports she is doing well this morning. Hoping to discharge ASAP.   Ambulating, voiding spontaneously, tolerating PO.   Denies headache, vision changes, chest pain, SOB, RUQ pain, increased swelling.               OBJECTIVE   Patient Vitals for the past 24 hrs:   BP Temp Temp src Pulse Resp Weight   24 0900 122/72 97.9  F (36.6  C) Oral 72 16 --   24 0632 -- -- -- -- -- 85.4 kg (188 lb 4.4 oz)   24 0508 131/73 -- -- 83 -- --   24 0328 126/74 -- -- 72 -- --   24 0026 131/79 97.7  F (36.5  C) Oral -- 16 --   24 2226 127/69 -- -- 76 -- --   24 2025 122/73 98  F (36.7  C) -- 87 -- --   24 1807 (!) 142/76 -- -- 83 -- --   24 1600 134/73 -- -- 77 -- --   24 1403 137/75 -- -- 82 -- --   24 1304 (!) 150/86 -- -- 84 -- --   24 1202 (!) 138/91 -- -- 90 -- --        Physical Exam  General: Alert, in no acute distress, resting comfortably in bed.   Neuro: Grossly normal to observation.  Psych: Alert, oriented, affect appropriate.  Cardiovascular: Normal rate, wwp.   Respiratory: Nonlabored breathing, equal chest rise/fall bilaterally.   Skin: Color, texture, turgor normal. No concerning rashes or lesions.    Labs  Hgb 12.9 > QBL 168mL > 11.8        ASSESSMENT & PLAN   Cherie Israel is a 34 year old  PPD#4 from  on  at 38w4d.    #. Postpartum Care:   - Recovering well with no acute concerns.  - Rh positive, Rhogam not indicated.  - Vaccines: up to date  - Infant Feeding: breastfeeding, going well  - PP Depression Risk: average  - Perineal Care: routine  - VTE Prophylaxis: ambulation  - Contraception: IVF pregnancy    #. Preeclampsia without Severe Features:  - HELLP labs stable  - Continue  Nifedipine 60mg q12hrs and Labetalol 200mg q8hrs.   - Will need close outpatient follow up upon discharge.     #. Hypothyroidism:  - Currently managed on Synthroid 50mcg  - Follows with Endo; last visit 12/13/23  - Last TSH 0.71 on 08/23,10/11 TSH 0.61    #. Disposition:   - Discharge this AM, return precautions reviewed.   - Follow up in 1wk for BP check and 6wks for full postpartum visit.     Lauren MacNeill, MD [facility]  [phone]  11/28/2024 9:22 AM

## 2024-11-28 NOTE — PLAN OF CARE
Data: Vital signs within normal limits. Postpartum checks within normal limits - see flow record. Patient eating and drinking normally. Patient able to empty bladder independently and is up ambulating. No apparent signs of infection. Patient performing self cares, is able to care for infant and is breastfeeding every 2-3 hours.   Laceration  appears within normal. Is using Ibuprofen for mild discomfort.    Action: Patient medicated during the shift for pain and blood pressures . See MAR.  Patient education done, see flow record.  Response: Patient's spouse present this shift.   Plan: Continue current plan of care.  Anticipate discharge on 11/28.      Goal Outcome Evaluation:      Plan of Care Reviewed With: patient    Overall Patient Progress: improvingOverall Patient Progress: improving       Problem: Adult Inpatient Plan of Care  Goal: Plan of Care Review  Description: The Plan of Care Review/Shift note should be completed every shift.  The Outcome Evaluation is a brief statement about your assessment that the patient is improving, declining, or no change.  This information will be displayed automatically on your shift  note.  Outcome: Progressing  Flowsheets (Taken 11/28/2024 0008)  Plan of Care Reviewed With: patient  Overall Patient Progress: improving  Goal: Absence of Hospital-Acquired Illness or Injury  Intervention: Prevent Skin Injury  Recent Flowsheet Documentation  Taken 11/27/2024 2025 by Paul Acevedo RN  Body Position: position changed independently  Goal: Optimal Comfort and Wellbeing  Intervention: Provide Person-Centered Care  Recent Flowsheet Documentation  Taken 11/27/2024 2025 by Paul Acevedo RN  Trust Relationship/Rapport:   care explained   choices provided   emotional support provided   empathic listening provided   questions answered   questions encouraged   reassurance provided   thoughts/feelings acknowledged     Problem: Labor  Goal: Stable Fetal Wellbeing  Intervention:  "Promote and Monitor Fetal Wellbeing  Recent Flowsheet Documentation  Taken 11/27/2024 2025 by Paul Acevedo RN  Body Position: position changed independently     Problem: Adult Inpatient Plan of Care  Goal: Plan of Care Review  Description: The Plan of Care Review/Shift note should be completed every shift.  The Outcome Evaluation is a brief statement about your assessment that the patient is improving, declining, or no change.  This information will be displayed automatically on your shift  note.  Outcome: Progressing  Flowsheets (Taken 11/28/2024 0008)  Plan of Care Reviewed With: patient  Overall Patient Progress: improving  Goal: Patient-Specific Goal (Individualized)  Description: You can add care plan individualizations to a care plan. Examples of Individualization might be:  \"Parent requests to be called daily at 9am for status\", \"I have a hard time hearing out of my right ear\", or \"Do not touch me to wake me up as it startles  me\".  Outcome: Progressing  Goal: Absence of Hospital-Acquired Illness or Injury  Outcome: Progressing  Intervention: Prevent Skin Injury  Recent Flowsheet Documentation  Taken 11/27/2024 2025 by Paul Acevedo RN  Body Position: position changed independently  Goal: Optimal Comfort and Wellbeing  Outcome: Progressing  Intervention: Provide Person-Centered Care  Recent Flowsheet Documentation  Taken 11/27/2024 2025 by Paul Acevedo RN  Trust Relationship/Rapport:   care explained   choices provided   emotional support provided   empathic listening provided   questions answered   questions encouraged   reassurance provided   thoughts/feelings acknowledged  Goal: Readiness for Transition of Care  Outcome: Progressing     Problem: Hypertensive Disorders in Pregnancy  Goal: Patient-Fetal Stabilization  Outcome: Progressing     Problem: Fall Injury Risk  Goal: Absence of Fall and Fall-Related Injury  Outcome: Progressing     Problem: Postpartum (Vaginal " Delivery)  Goal: Successful Parent Role Transition  Outcome: Progressing  Goal: Hemostasis  Outcome: Progressing  Goal: Absence of Infection Signs and Symptoms  Outcome: Progressing  Goal: Anesthesia/Sedation Recovery  Outcome: Progressing  Goal: Optimal Pain Control and Function  Outcome: Progressing  Goal: Effective Urinary Elimination  Outcome: Progressing

## 2024-11-28 NOTE — PROVIDER NOTIFICATION
11/27/24 2031   Provider Notification   Provider Name/Title Mattyisael   Method of Notification Electronic Page   Request Evaluate-Remote   Notification Reason Status Update     Was told by day shift nurse to update MD after blood pressure @ 2015. It was 122/73. He would like to keep the original medication plan. He would like to have another blood pressure check at 2215 and to be updated in case it is another mild-he may like to modify the plan.

## 2025-07-19 ENCOUNTER — HEALTH MAINTENANCE LETTER (OUTPATIENT)
Age: 35
End: 2025-07-19

## 2025-08-04 NOTE — PROVIDER NOTIFICATION
01/06/22 2035   Provider Notification   Provider Name/Title HarshadMD   Method of Notification Phone   Request Evaluate - Remote   Notification Reason Labor Status;Uterine Activity;SVE   Dr updated re VE, UC's Cat 1 FHTs, and epidural. Will update her again after pt comfortable and straight cath placed      08/04/25 0921   Discharge Planning   Living Arrangements Spouse/significant other   Support Systems Spouse/significant other;Family members;Parent;Children   Assistance Needed PTA - none at baseline, independent   Type of Residence Private residence   Home or Post Acute Services None   Expected Discharge Disposition Home   Does the patient need discharge transport arranged? Yes   [facility] coordination needed? Yes   Intensity of Service   Intensity of Service 0-30 min     Cardiology following. HOME at dc.